# Patient Record
Sex: FEMALE | Race: WHITE | NOT HISPANIC OR LATINO | Employment: FULL TIME | URBAN - METROPOLITAN AREA
[De-identification: names, ages, dates, MRNs, and addresses within clinical notes are randomized per-mention and may not be internally consistent; named-entity substitution may affect disease eponyms.]

---

## 2018-09-19 ENCOUNTER — TELEPHONE (OUTPATIENT)
Dept: ELECTROPHYSIOLOGY | Facility: CLINIC | Age: 58
End: 2018-09-19

## 2018-09-19 NOTE — TELEPHONE ENCOUNTER
----- Message from Chastity Glez MA sent at 9/19/2018  9:50 AM CDT -----  Spoke to pt was in ed with PSVT on 9/11.still not feeling great. Pt is requesting ASAP appt in [place]. Can you look into this and call to triage? Dr Humphreys is in [place] on Monday.  ----- Message -----  From: Chastity Glez MA  Sent: 9/19/2018   9:42 AM  To: Chastity Glez MA        ----- Message -----  From: Dinora Perez  Sent: 9/19/2018   9:28 AM  To: [facility] Staff    Type: Needs Appointment     Who Called:  Patient  Best Call Back Number: [phone]  Additional Information: Patient left previous message requesting appointment with doctor/has not heard from anyone/please call patient back to schedule or advise. Patient had SVT.

## 2018-09-19 NOTE — TELEPHONE ENCOUNTER
Spoke with patient. She was in the ER with SVT and converted with IV meds. She needs to be seen soon. Dr Humphreys gave the ok to overbook for [place] on Monday. Advised patient that I will send a message to Nimisha Meadows with the [facility] staff to schedule appt with Dr Humphreys on Monday. She verbalizes understanding.

## 2018-09-20 ENCOUNTER — TELEPHONE (OUTPATIENT)
Dept: ELECTROPHYSIOLOGY | Facility: CLINIC | Age: 58
End: 2018-09-20

## 2018-09-20 NOTE — TELEPHONE ENCOUNTER
----- Message from Chastity Glez MA sent at 9/20/2018  8:53 AM CDT -----  Contact: Patient      ----- Message -----  From: Marietta Valenzuela  Sent: 9/20/2018   8:38 AM  To: Petr Hollis Staff    The Pt states that it is ok for the appointment in [place] Please call her back @ [phone]. ThanksMarietta

## 2018-09-20 NOTE — TELEPHONE ENCOUNTER
Olimpia spoke with patient and advised to go to [place] office at 1:20pm on Monday for appt with Dr Humphreys.

## 2018-09-24 ENCOUNTER — INITIAL CONSULT (OUTPATIENT)
Dept: CARDIOLOGY | Facility: CLINIC | Age: 58
End: 2018-09-24
Payer: COMMERCIAL

## 2018-09-24 ENCOUNTER — PATIENT MESSAGE (OUTPATIENT)
Dept: ELECTROPHYSIOLOGY | Facility: CLINIC | Age: 58
End: 2018-09-24

## 2018-09-24 VITALS
HEART RATE: 87 BPM | HEIGHT: 65 IN | SYSTOLIC BLOOD PRESSURE: 134 MMHG | WEIGHT: 222 LBS | BODY MASS INDEX: 36.99 KG/M2 | DIASTOLIC BLOOD PRESSURE: 90 MMHG

## 2018-09-24 DIAGNOSIS — F41.9 ANXIETY DISORDER, UNSPECIFIED TYPE: ICD-10-CM

## 2018-09-24 DIAGNOSIS — I10 HYPERTENSION, WELL CONTROLLED: ICD-10-CM

## 2018-09-24 DIAGNOSIS — I47.10 SVT (SUPRAVENTRICULAR TACHYCARDIA): Primary | ICD-10-CM

## 2018-09-24 PROCEDURE — 99244 OFF/OP CNSLTJ NEW/EST MOD 40: CPT | Mod: S$GLB,,, | Performed by: INTERNAL MEDICINE

## 2018-09-24 PROCEDURE — 99999 PR PBB SHADOW E&M-EST. PATIENT-LVL III: CPT | Mod: PBBFAC,,, | Performed by: INTERNAL MEDICINE

## 2018-09-24 NOTE — PROGRESS NOTES
Subjective:    Patient ID:  Dennise Watkins is a 58 y.o. female who presents for evaluation of PSVT (Post hosp f/u - Dr. Talamantes [facility] - Ref by Dr. Soto)      HPI  59 yo female with SVT, Htn, DM, anxiety.  Primary cardiologist is Dr. Soto.  Presented to [facility] ED 9/11/18 with sustained palpitations and presyncope.  She was doing carpool at school.  Bent over to pick something up and started walking.  Acute onset of symptoms.  ECG revealed SVT with possible pseudo R prime.  Terminated with 6 mg of Adenosine.  15 minutes later had recurrence, again terminated with Adenosine.  Toprol increased from 25 mg daily to 50 mg in am + 25 mg in PM.  Notes occasional high HR's, ie HR's in the 110's when gardening, but nothing similar to above.  Feeling ok.  Notes occasional dizziness.  Denies palpitations.      Review of Systems   Constitution: Negative. Negative for weakness and malaise/fatigue.   Cardiovascular: Positive for palpitations. Negative for chest pain, dyspnea on exertion, irregular heartbeat, leg swelling, near-syncope, orthopnea, paroxysmal nocturnal dyspnea and syncope.   Respiratory: Negative for cough and shortness of breath.    Neurological: Positive for dizziness. Negative for light-headedness.   All other systems reviewed and are negative.       Objective:    Physical Exam   Constitutional: She is oriented to person, place, and time. She appears well-developed and well-nourished.   Eyes: Conjunctivae are normal. No scleral icterus.   Neck: No JVD present. No tracheal deviation present.   Cardiovascular: Normal rate and regular rhythm. PMI is not displaced.   Pulmonary/Chest: Effort normal and breath sounds normal. No respiratory distress.   Abdominal: Soft. There is no hepatosplenomegaly. There is no tenderness.   Musculoskeletal: She exhibits no edema or tenderness.   Neurological: She is alert and oriented to person, place, and time.   Skin: Skin is warm and dry. No rash noted.   Psychiatric: She has a normal  mood and affect. Her behavior is normal.         Assessment:       1. SVT (supraventricular tachycardia)    2. Hypertension, well controlled    3. Uncontrolled type 2 diabetes mellitus without complication, without long-term current use of insulin    4. Anxiety disorder, unspecified type         Plan:           SVT, very symptomatic.  Offered RFA.  Risks and benefits of RFA discussed, she would like to proceed.  Hold Toprol 3 days prior.  Anesthesia (prefer MAC).

## 2018-09-24 NOTE — PROGRESS NOTES
ABLATION EDUCATION CHECKLIST    DAY OF PROCEDURE:    9/28/18 @ 9 AM  Report to Cardiology Waiting Room on 3rd floor of the Hospital    · Do not eat or drink anything after: 12 mn on the night before your procedure  · Please do not wear makeup (especially mascara) when arriving for your procedure    Medications:   · HOLD Toprol (Metoprolol) for 3 days prior to ablation. Last dose will be 9/24/2018  · You may take your other usual morning medications with a sip of water      Directions to the Cardiology Waiting Room  If you park in the Parking Garage:  Take elevators to the 2nd floor  Walk up ramp and turn right by [address] Elevators  Take elevator to the 3rd floor  Upon exiting the elevator, turn away from the clinic areas  Walk [address] around to front of hospital to area with windows overlooking [facility]  Check in at Reception Desk  OR  If family is dropping you off:  Have them drop you off at the front of the Hospital  (Near the ER, where all the flags are hung).  Take the E elevators to the 3rd floor.  Check in at the Reception Desk in the waiting room.    · You will be spending the night after your procedure.  · You will need someone to drive you home the day after your procedure.  · Your pain during your procedure will be managed by the anesthesia team.     Any need to reschedule or cancel procedures, or any questions regarding your procedures should be addressed directly with the Arrhythmia Department Nurses at the following phone number: [phone]

## 2018-09-24 NOTE — H&P (VIEW-ONLY)
Subjective:    Patient ID:  Dennise Watkins is a 58 y.o. female who presents for evaluation of PSVT (Post hosp f/u - Dr. Talamantes [facility] - Ref by Dr. Soto)      HPI  59 yo female with SVT, Htn, DM, anxiety.  Primary cardiologist is Dr. Soto.  Presented to [facility] ED 9/11/18 with sustained palpitations and presyncope.  She was doing carpool at school.  Bent over to pick something up and started walking.  Acute onset of symptoms.  ECG revealed SVT with possible pseudo R prime.  Terminated with 6 mg of Adenosine.  15 minutes later had recurrence, again terminated with Adenosine.  Toprol increased from 25 mg daily to 50 mg in am + 25 mg in PM.  Notes occasional high HR's, ie HR's in the 110's when gardening, but nothing similar to above.  Feeling ok.  Notes occasional dizziness.  Denies palpitations.      Review of Systems   Constitution: Negative. Negative for weakness and malaise/fatigue.   Cardiovascular: Positive for palpitations. Negative for chest pain, dyspnea on exertion, irregular heartbeat, leg swelling, near-syncope, orthopnea, paroxysmal nocturnal dyspnea and syncope.   Respiratory: Negative for cough and shortness of breath.    Neurological: Positive for dizziness. Negative for light-headedness.   All other systems reviewed and are negative.       Objective:    Physical Exam   Constitutional: She is oriented to person, place, and time. She appears well-developed and well-nourished.   Eyes: Conjunctivae are normal. No scleral icterus.   Neck: No JVD present. No tracheal deviation present.   Cardiovascular: Normal rate and regular rhythm. PMI is not displaced.   Pulmonary/Chest: Effort normal and breath sounds normal. No respiratory distress.   Abdominal: Soft. There is no hepatosplenomegaly. There is no tenderness.   Musculoskeletal: She exhibits no edema or tenderness.   Neurological: She is alert and oriented to person, place, and time.   Skin: Skin is warm and dry. No rash noted.   Psychiatric: She has a normal  mood and affect. Her behavior is normal.         Assessment:       1. SVT (supraventricular tachycardia)    2. Hypertension, well controlled    3. Uncontrolled type 2 diabetes mellitus without complication, without long-term current use of insulin    4. Anxiety disorder, unspecified type         Plan:           SVT, very symptomatic.  Offered RFA.  Risks and benefits of RFA discussed, she would like to proceed.  Hold Toprol 3 days prior.  Anesthesia (prefer MAC).

## 2018-09-27 ENCOUNTER — TELEPHONE (OUTPATIENT)
Dept: ELECTROPHYSIOLOGY | Facility: CLINIC | Age: 58
End: 2018-09-27

## 2018-09-27 NOTE — TELEPHONE ENCOUNTER
Spoke with patient and confirmed that she took her last dose of metoprolol on 9/24/18, as instructed. Reviewed all pre-op instructions and she verbalized understanding, voicing no other questions or concerns.

## 2018-09-28 ENCOUNTER — HOSPITAL ENCOUNTER (OUTPATIENT)
Facility: HOSPITAL | Age: 58
Discharge: HOME | End: 2018-09-28
Attending: INTERNAL MEDICINE | Admitting: INTERNAL MEDICINE
Payer: COMMERCIAL

## 2018-09-28 ENCOUNTER — ANESTHESIA (OUTPATIENT)
Dept: MEDSURG UNIT | Facility: HOSPITAL | Age: 58
End: 2018-09-28
Payer: COMMERCIAL

## 2018-09-28 ENCOUNTER — ANESTHESIA EVENT (OUTPATIENT)
Dept: MEDSURG UNIT | Facility: HOSPITAL | Age: 58
End: 2018-09-28
Payer: COMMERCIAL

## 2018-09-28 VITALS
TEMPERATURE: 97 F | BODY MASS INDEX: 36.32 KG/M2 | SYSTOLIC BLOOD PRESSURE: 138 MMHG | RESPIRATION RATE: 18 BRPM | WEIGHT: 218 LBS | DIASTOLIC BLOOD PRESSURE: 74 MMHG | OXYGEN SATURATION: 98 % | HEART RATE: 92 BPM | HEIGHT: 65 IN

## 2018-09-28 DIAGNOSIS — N39.0 URINARY TRACT INFECTION: ICD-10-CM

## 2018-09-28 DIAGNOSIS — I47.10 SVT (SUPRAVENTRICULAR TACHYCARDIA): Primary | ICD-10-CM

## 2018-09-28 DIAGNOSIS — I47.10 SUPRAVENTRICULAR TACHYCARDIA: ICD-10-CM

## 2018-09-28 LAB
APTT BLDCRRT: 23.9 SEC
INR PPP: 1
POCT GLUCOSE: 101 MG/DL (ref 70–110)
PROTHROMBIN TIME: 10.6 SEC

## 2018-09-28 PROCEDURE — 63600175 PHARM REV CODE 636 W HCPCS

## 2018-09-28 PROCEDURE — 37000008 HC ANESTHESIA 1ST 15 MINUTES: Performed by: INTERNAL MEDICINE

## 2018-09-28 PROCEDURE — 93623 PRGRMD STIMJ&PACG IV RX NFS: CPT | Mod: 26,,, | Performed by: INTERNAL MEDICINE

## 2018-09-28 PROCEDURE — 25000003 PHARM REV CODE 250

## 2018-09-28 PROCEDURE — 93005 ELECTROCARDIOGRAM TRACING: CPT

## 2018-09-28 PROCEDURE — 37000009 HC ANESTHESIA EA ADD 15 MINS: Performed by: INTERNAL MEDICINE

## 2018-09-28 PROCEDURE — 63600175 PHARM REV CODE 636 W HCPCS: Performed by: NURSE ANESTHETIST, CERTIFIED REGISTERED

## 2018-09-28 PROCEDURE — D9220A PRA ANESTHESIA: Mod: ,,, | Performed by: ANESTHESIOLOGY

## 2018-09-28 PROCEDURE — 25000003 PHARM REV CODE 250: Performed by: NURSE PRACTITIONER

## 2018-09-28 PROCEDURE — 93010 ELECTROCARDIOGRAM REPORT: CPT | Mod: ,,, | Performed by: INTERNAL MEDICINE

## 2018-09-28 PROCEDURE — 93653 COMPRE EP EVAL TX SVT: CPT | Mod: ,,, | Performed by: INTERNAL MEDICINE

## 2018-09-28 PROCEDURE — 93621 COMP EP EVL L PAC&REC C SINS: CPT | Mod: 26,,, | Performed by: INTERNAL MEDICINE

## 2018-09-28 PROCEDURE — 85610 PROTHROMBIN TIME: CPT

## 2018-09-28 PROCEDURE — 85730 THROMBOPLASTIN TIME PARTIAL: CPT

## 2018-09-28 PROCEDURE — C1730 CATH, EP, 19 OR FEW ELECT: HCPCS

## 2018-09-28 PROCEDURE — 82962 GLUCOSE BLOOD TEST: CPT | Performed by: INTERNAL MEDICINE

## 2018-09-28 PROCEDURE — 93613 INTRACARDIAC EPHYS 3D MAPG: CPT | Mod: ,,, | Performed by: INTERNAL MEDICINE

## 2018-09-28 PROCEDURE — 25000003 PHARM REV CODE 250: Performed by: NURSE ANESTHETIST, CERTIFIED REGISTERED

## 2018-09-28 RX ORDER — DIPHENHYDRAMINE HYDROCHLORIDE 50 MG/ML
25 INJECTION INTRAMUSCULAR; INTRAVENOUS EVERY 6 HOURS PRN
Status: DISCONTINUED | OUTPATIENT
Start: 2018-09-28 | End: 2018-09-28 | Stop reason: HOSPADM

## 2018-09-28 RX ORDER — ACETAMINOPHEN 325 MG/1
650 TABLET ORAL ONCE
Status: DISCONTINUED | OUTPATIENT
Start: 2018-09-28 | End: 2018-09-28 | Stop reason: HOSPADM

## 2018-09-28 RX ORDER — FENTANYL CITRATE 50 UG/ML
25 INJECTION, SOLUTION INTRAMUSCULAR; INTRAVENOUS EVERY 5 MIN PRN
Status: DISCONTINUED | OUTPATIENT
Start: 2018-09-28 | End: 2018-09-28 | Stop reason: HOSPADM

## 2018-09-28 RX ORDER — SODIUM CHLORIDE 0.9 % (FLUSH) 0.9 %
3 SYRINGE (ML) INJECTION
Status: DISCONTINUED | OUTPATIENT
Start: 2018-09-28 | End: 2018-09-28 | Stop reason: HOSPADM

## 2018-09-28 RX ORDER — IBUPROFEN 400 MG/1
400 TABLET ORAL ONCE
Status: COMPLETED | OUTPATIENT
Start: 2018-09-28 | End: 2018-09-28

## 2018-09-28 RX ORDER — MIDAZOLAM HYDROCHLORIDE 1 MG/ML
INJECTION, SOLUTION INTRAMUSCULAR; INTRAVENOUS
Status: DISCONTINUED | OUTPATIENT
Start: 2018-09-28 | End: 2018-09-28

## 2018-09-28 RX ORDER — PHENYLEPHRINE HYDROCHLORIDE 10 MG/ML
INJECTION INTRAVENOUS
Status: DISCONTINUED | OUTPATIENT
Start: 2018-09-28 | End: 2018-09-28

## 2018-09-28 RX ORDER — SODIUM CHLORIDE 9 MG/ML
INJECTION, SOLUTION INTRAVENOUS CONTINUOUS
Status: DISCONTINUED | OUTPATIENT
Start: 2018-09-28 | End: 2018-09-28 | Stop reason: HOSPADM

## 2018-09-28 RX ORDER — ONDANSETRON 2 MG/ML
INJECTION INTRAMUSCULAR; INTRAVENOUS
Status: DISCONTINUED | OUTPATIENT
Start: 2018-09-28 | End: 2018-09-28

## 2018-09-28 RX ORDER — FENTANYL CITRATE 50 UG/ML
INJECTION, SOLUTION INTRAMUSCULAR; INTRAVENOUS
Status: DISCONTINUED | OUTPATIENT
Start: 2018-09-28 | End: 2018-09-28

## 2018-09-28 RX ORDER — PROPOFOL 10 MG/ML
VIAL (ML) INTRAVENOUS CONTINUOUS PRN
Status: DISCONTINUED | OUTPATIENT
Start: 2018-09-28 | End: 2018-09-28

## 2018-09-28 RX ORDER — LIDOCAINE HCL/PF 100 MG/5ML
SYRINGE (ML) INTRAVENOUS
Status: DISCONTINUED | OUTPATIENT
Start: 2018-09-28 | End: 2018-09-28

## 2018-09-28 RX ORDER — EPHEDRINE SULFATE 50 MG/ML
INJECTION, SOLUTION INTRAVENOUS
Status: DISCONTINUED | OUTPATIENT
Start: 2018-09-28 | End: 2018-09-28

## 2018-09-28 RX ADMIN — PHENYLEPHRINE HYDROCHLORIDE 100 MCG: 10 INJECTION INTRAVENOUS at 12:09

## 2018-09-28 RX ADMIN — CALCIUM CHLORIDE 0.5 G: 100 INJECTION, SOLUTION INTRAVENOUS at 12:09

## 2018-09-28 RX ADMIN — SODIUM CHLORIDE, SODIUM GLUCONATE, SODIUM ACETATE, POTASSIUM CHLORIDE, MAGNESIUM CHLORIDE, SODIUM PHOSPHATE, DIBASIC, AND POTASSIUM PHOSPHATE: .53; .5; .37; .037; .03; .012; .00082 INJECTION, SOLUTION INTRAVENOUS at 12:09

## 2018-09-28 RX ADMIN — PHENYLEPHRINE HYDROCHLORIDE 100 MCG: 10 INJECTION INTRAVENOUS at 11:09

## 2018-09-28 RX ADMIN — FENTANYL CITRATE 25 MCG: 50 INJECTION, SOLUTION INTRAMUSCULAR; INTRAVENOUS at 01:09

## 2018-09-28 RX ADMIN — PROPOFOL 100 MCG/KG/MIN: 10 INJECTION, EMULSION INTRAVENOUS at 11:09

## 2018-09-28 RX ADMIN — FENTANYL CITRATE 25 MCG: 50 INJECTION, SOLUTION INTRAMUSCULAR; INTRAVENOUS at 12:09

## 2018-09-28 RX ADMIN — FENTANYL CITRATE 25 MCG: 50 INJECTION, SOLUTION INTRAMUSCULAR; INTRAVENOUS at 11:09

## 2018-09-28 RX ADMIN — SODIUM CHLORIDE: 0.9 INJECTION, SOLUTION INTRAVENOUS at 11:09

## 2018-09-28 RX ADMIN — ISOPROTERENOL HYDROCHLORIDE 2 MCG/MIN: 0.2 INJECTION, SOLUTION INTRAMUSCULAR; INTRAVENOUS at 12:09

## 2018-09-28 RX ADMIN — IBUPROFEN 400 MG: 400 TABLET, FILM COATED ORAL at 05:09

## 2018-09-28 RX ADMIN — EPHEDRINE SULFATE 10 MG: 50 INJECTION, SOLUTION INTRAMUSCULAR; INTRAVENOUS; SUBCUTANEOUS at 12:09

## 2018-09-28 RX ADMIN — MIDAZOLAM 2 MG: 1 INJECTION INTRAMUSCULAR; INTRAVENOUS at 11:09

## 2018-09-28 RX ADMIN — PHENYLEPHRINE HYDROCHLORIDE 200 MCG: 10 INJECTION INTRAVENOUS at 12:09

## 2018-09-28 RX ADMIN — PHENYLEPHRINE HYDROCHLORIDE 0.25 MCG/KG/MIN: 10 INJECTION INTRAVENOUS at 12:09

## 2018-09-28 RX ADMIN — LIDOCAINE HYDROCHLORIDE 50 MG: 20 INJECTION, SOLUTION INTRAVENOUS at 11:09

## 2018-09-28 NOTE — PROGRESS NOTES
Patient admitted to recovery see [facility] for complete assessment pacu bcg's maintained safety measures verified patient instructed on pain scale and patient verbalized understanding. Called for ekg also called patient's . Updated patient's  on patient location with the permission of patient.

## 2018-09-28 NOTE — PLAN OF CARE
Problem: Patient Care Overview  Goal: Plan of Care Review  Outcome: Ongoing (interventions implemented as appropriate)  Pt admitted to room.  Alert and oriented.  IV placed.  Labs sent.  Plan of care reviewed with patient.

## 2018-09-28 NOTE — ANESTHESIA POSTPROCEDURE EVALUATION
"Anesthesia Post Evaluation    Patient: Dennise Watkins    Procedure(s) Performed: Procedure(s) (LRB):  Ablation (N/A)    Final Anesthesia Type: general  Patient location during evaluation: PACU  Patient participation: Yes- Able to Participate  Level of consciousness: awake and alert  Post-procedure vital signs: reviewed and stable  Pain management: adequate  Airway patency: patent  PONV status at discharge: No PONV  Anesthetic complications: no      Cardiovascular status: hemodynamically stable  Respiratory status: unassisted  Hydration status: euvolemic  Follow-up not needed.        Visit Vitals  BP (!) 141/88 (BP Location: Right arm, Patient Position: Lying)   Pulse 99   Temp 35.9 °C (96.6 °F) (Oral)   Resp 18   Ht 5' 5" (1.651 m)   Wt 98.9 kg (218 lb)   SpO2 98%   Breastfeeding? No   BMI 36.28 kg/m²       Pain/Yasir Score: Pain Assessment Performed: Yes (9/28/2018  3:30 PM)  Presence of Pain: denies (9/28/2018  3:30 PM)  Yasir Score: 10 (9/28/2018  3:00 PM)        "

## 2018-09-28 NOTE — ANESTHESIA PREPROCEDURE EVALUATION
09/28/2018  Pre-operative evaluation for Procedure(s) (LRB):  Ablation (N/A)    Dennise Watkins is a 58 y.o. female HTN, DMII, anxiety    Patient Active Problem List   Diagnosis    Recurrent UTI    DM (diabetes mellitus), type 2, uncontrolled    Hypertension, well controlled    SVT (supraventricular tachycardia)    Anxiety disorder       Review of patient's allergies indicates:   Allergen Reactions    Sulfa (sulfonamide antibiotics) Hives    Aleve [naproxen sodium] Other (See Comments)     Nose bleed    Amoxicillin-pot clavulanate Diarrhea     Other reaction(s): diarrhea       No current facility-administered medications on file prior to encounter.      Current Outpatient Medications on File Prior to Encounter   Medication Sig Dispense Refill    alprazolam (XANAX) 0.25 MG tablet TAKE 1 TABLET BY MOUTH 2 TIMES A DAY AS NEEDED 25 tablet 0    amlodipine (NORVASC) 5 MG tablet Take 5 mg by mouth daily as needed.      cefUROXime (CEFTIN) 500 MG tablet Take 1 tablet by mouth 2 (two) times daily.      citalopram (CELEXA) 20 MG tablet 1 po twice daily 60 tablet 9    fluticasone (FLONASE) 50 mcg/actuation nasal spray 2 sprays by Each Nare route daily as needed for Rhinitis or Allergies. 16 g 3    ketoconazole (NIZORAL) 2 % shampoo Use daily as needed 120 mL 6    levocetirizine (XYZAL) 5 MG tablet Take 5 mg by mouth nightly as needed.   5    metoprolol succinate (TOPROL-XL) 50 MG 24 hr tablet Take 1 tablet (50 mg total) by mouth once daily. (Patient taking differently: Take 50 mg by mouth 2 (two) times daily. ) 30 tablet 2    MINIVELLE 0.0375 mg/24 hr 1 patch twice a week      mometasone 0.1% (ELOCON) 0.1 % cream Apply to affected area daily 45 g 1    NATURE-THROID 65 mg Tab Take 1 tablet by mouth every morning.  0    olmesartan-hydrochlorothiazide (BENICAR HCT) 40-12.5 mg Tab Take 1 tablet by mouth  once daily. 90 tablet 3    omeprazole (PRILOSEC) 20 MG capsule TAKE 1 CAPSULE TWICE A  capsule 3    pimecrolimus (ELIDEL) 1 % cream Use BID as needed 30 g 3    terconazole (TERAZOL 7) 0.4 % Crea 0.4 %.      VENTOLIN HFA 90 mcg/actuation inhaler Inhale 2 puffs into the lungs 3 (three) times daily.         Past Surgical History:   Procedure Laterality Date    ESOPHAGOGASTRODUODENOSCOPY      HYSTERECTOMY      OOPHORECTOMY  2000    RHINOPLASTY TIP      TONSILLECTOMY, ADENOIDECTOMY         Social History     Socioeconomic History    Marital status:      Spouse name: Not on file    Number of children: Not on file    Years of education: Not on file    Highest education level: Not on file   Social Needs    Financial resource strain: Not on file    Food insecurity - worry: Not on file    Food insecurity - inability: Not on file    Transportation needs - medical: Not on file    Transportation needs - non-medical: Not on file   Occupational History    Not on file   Tobacco Use    Smoking status: Never Smoker    Smokeless tobacco: Never Used   Substance and Sexual Activity    Alcohol use: No    Drug use: No    Sexual activity: Yes     Partners: Male   Other Topics Concern    Not on file   Social History Narrative    Not on file         CBC: No results for input(s): WBC, RBC, HGB, HCT, PLT, MCV, MCH, MCHC in the last 72 hours.    CMP: No results for input(s): NA, K, CL, CO2, BUN, CREATININE, GLU, MG, PHOS, CALCIUM, ALBUMIN, PROT, ALKPHOS, ALT, AST, BILITOT in the last 72 hours.    INR  No results for input(s): PT, INR, PROTIME, APTT in the last 72 hours.        Diagnostic Studies:      EKD Echo:  No results found for this or any previous visit.      Anesthesia Evaluation    I have reviewed the Patient Summary Reports.     I have reviewed the Medications.     Review of Systems  Anesthesia Hx:  History of prior surgery of interest to airway management or planning: Denies Family  Hx of Anesthesia complications.   Denies Personal Hx of Anesthesia complications.       Physical Exam  General:  Obesity    Airway/Jaw/Neck:  Airway Findings: Mouth Opening: Normal Tongue: Normal  General Airway Assessment: Adult  Mallampati: II  TM Distance: Normal, at least 6 cm  Jaw/Neck Findings:  Neck ROM: Normal ROM      Dental:  Dental Findings: In tact   Chest/Lungs:  Chest/Lungs Findings: Clear to auscultation, Normal Respiratory Rate         Mental Status:  Mental Status Findings:  Cooperative, Alert and Oriented         Anesthesia Plan  Type of Anesthesia, risks & benefits discussed:  Anesthesia Type:  general  Patient's Preference:   Intra-op Monitoring Plan: standard ASA monitors  Intra-op Monitoring Plan Comments:   Post Op Pain Control Plan: multimodal analgesia  Post Op Pain Control Plan Comments:   Induction:   IV  Beta Blocker:  Patient is on a Beta-Blocker and has received one dose within the past 24 hours (No further documentation required).       Informed Consent: Patient understands risks and agrees with Anesthesia plan.  Questions answered. Anesthesia consent signed with patient.  ASA Score: 3     Day of Surgery Review of History & Physical:    H&P update referred to the surgeon.         Ready For Surgery From Anesthesia Perspective.

## 2018-09-28 NOTE — INTERVAL H&P NOTE
Mrs. Watkins is a 58 y.o. Female with a PMHx of SVT, HTN, DM, and anxiety. She presents today for a scheduled SVT ablation with Dr. Humphreys. She denies any palpitations, chest pain, SOB, ROSARIO, light headedness, dizziness, syncope or syncopal episodes, infections, bleeding, or rashes. She denies any surgeries in the past 30 days. She is currently taking metoprolol 50 mg daily, last dose Monday (9/24/18), norvasc 5 mg PRN, last dose three weeks ago. She states she is no longer taking benicar, instead taking diovan 12.5 mg daily, last dose this AM (9/28/18). She denies the use of aspirin or any blood thinners.    EKG this morning reveals NSR at 90 BPM. VSS. EKG reviewed by Dr. Humphreys.  Labs from today and 9/21/18 reviewed. Dr. Humphreys aware.     The patient has been examined and the H&P has been reviewed:    I concur with the findings and no changes have occurred since H&P was written.     Review of Systems   Constitution: No acute distress, pleasant patient answering questions appropriately, negative for fevers/chills, malaise/fatigue, or weakness.   HENT: Negative for ear pain and tinnitus.   Eyes: Negative for blurred vision.   Cardiovascular:  Negative for chest pain, palpitations, near-syncope, and syncope.   Respiratory: Negative for shortness of breath or ROSARIO.  Endocrine:  Negative for polyuria.   Hematologic/Lymphatic: Negative for bruise/bleed easily.   Skin: Negative for rash.   Musculoskeletal: Negative for joint pain and muscle weakness.   Extremity: Negative for swelling in the lower extremities.   Gastrointestinal: Negative for abdominal pain and change in bowel habit.   Genitourinary: Negative for frequency.   Neurological: Negative for dizziness.   Psychiatric/Behavioral: Negative for depression, anxiety     Physical Exam:   General: Well developed, well nourished, No distress on room air   HEENT: Head is normocephalic, atraumatic;  Lungs: Clear to auscultation bilaterally.  No wheezing. Normal respiratory  effort.  Cardiovascular: S1 S2 Normal rate, regular rhythm and normal heart sounds, PMI is not displaced.  Extremities: No LE edema. Pulses 2+ and symmetric.   Abdomen: Abdomen is soft, non-tender non-distended with normal bowel sounds.  Skin: Skin color, texture, turgor normal. No rashes.  Musculoskeletal: Normal range of motion.   Neurologic: Normal strength and tone. No focal numbness or weakness.   Psychiatric: Normal mood and affect. Behavior is normal. Alert and oriented X 3.    Prior to procedure, extensive discussion with patient and  regarding risks and benefits of ablation, and patient would like to proceed.  Risks of procedure include but are not limited to the risk of infection, bleeding, stroke, paralysis,  MI, death, embolism, perforation requiring emergency draining or surgery, AV block, possibility for need for  pacemaker implantation.  The patient and  voice understanding and all questions have been answered. No further questions/concerns voiced at this time.       Anesthesia/Surgery risks, benefits and alternative options discussed and understood by patient/family.    Active Hospital Problems    Diagnosis  POA    SVT (supraventricular tachycardia) [I47.1]  Yes      Resolved Hospital Problems   No resolved problems to display.     ROSALINE Mckeon-CORRIE  Cardiology Electrophysiology  NP   [facility]    Attending: Bunny Humphreys MD

## 2018-09-28 NOTE — PLAN OF CARE
Problem: Patient Care Overview  Goal: Plan of Care Review  Outcome: Ongoing (interventions implemented as appropriate)  Report received from JESSICA Whitmore. Patient s/p svt rfa. bilat groin sites cdi, soft. + pulses. Post procedure protocol dicussed with patient. Call light in reach, will monitor.

## 2018-09-28 NOTE — NURSING TRANSFER
Nursing Transfer Note      9/28/2018     Transfer To: short stay 10    Transfer via stretcher    Transfer with cardiac monitoring    Transported by juanis rn    Medicines sent: none    Chart send with patient: Yes    Notified: nurse    Patient reassessed at: see epic (date, time)    Upon arrival to floor: to room no complaints no distress noted.

## 2018-09-28 NOTE — TRANSFER OF CARE
"Anesthesia Transfer of Care Note    Patient: Dennise Watkins    Procedure(s) Performed: Procedure(s) (LRB):  Ablation (N/A)    Patient location: Cath Lab    Anesthesia Type: general    Transport from OR: Transported from OR on room air with adequate spontaneous ventilation    Post pain: adequate analgesia    Post assessment: no apparent anesthetic complications and tolerated procedure well    Post vital signs: stable    Level of consciousness: awake    Nausea/Vomiting: no nausea/vomiting    Complications: none    Transfer of care protocol was followed      Last vitals:   Visit Vitals  BP (!) 159/85 (BP Location: Right arm)   Pulse 97   Temp 36.3 °C (97.4 °F)   Resp 16   Ht 5' 5" (1.651 m)   Wt 98.9 kg (218 lb)   SpO2 97%   Breastfeeding? No   BMI 36.28 kg/m²     "

## 2018-09-29 NOTE — DISCHARGE SUMMARY
[facility]  Cardiac Electrophysiology  Discharge Summary      Patient Name: Dennise Watkins  MRN: [id number]  Admission Date: 9/28/2018  Hospital Length of Stay: 0 days  Discharge Date and Time:  09/28/2018 8:44 PM  Attending Physician: Bunny Humphreys MD  Discharging Provider: Brittany Babcock NP  Primary Care Physician: Junito Gilmore II, MD    HPI:   Mrs. Watkins is a 58 y.o. Female with a PMHx of SVT, HTN, DM, and anxiety. She presents today for a scheduled SVT ablation with Dr. Humphreys. She denies any palpitations, chest pain, SOB, ROSARIO, light headedness, dizziness, syncope or syncopal episodes, infections, bleeding, or rashes. She denies any surgeries in the past 30 days. She is currently taking metoprolol 50 mg daily, last dose Monday (9/24/18), norvasc 5 mg PRN, last dose three weeks ago. She states she is no longer taking benicar, instead taking diovan 12.5 mg daily, last dose this AM (9/28/18). She denies the use of aspirin or any blood thinners.     EKG this morning reveals NSR at 90 BPM. VSS. EKG reviewed by Dr. Humphreys.  Labs from today and 9/21/18 reviewed. Dr. Humphreys aware.      The patient has been examined and the H&P has been reviewed:     I concur with the findings and no changes have occurred since H&P was written.      Procedure(s) (LRB):  Ablation (N/A)     Indwelling Lines/Drains at time of discharge:  Lines/Drains/Airways          None          Hospital Course: Patient underwent SVT ablation (see procedure note for details), tolerated procedure with no acute complications. VSS. Bilateral groins with no bleeding or hematoma noted, dressings C/D/I. Patient ambulating, tolerating PO intake, and voiding with no issues. Patient to continue all home medications. Pt to follow up with Bunny Humphreys MD in 2 months. Discharge plans/instructions discussed with patient, hisband, and daughter who verbalized understanding and agreement of plans of care. No further questions or concerns voiced  at this time     Consults: Anesthesia.    Significant Diagnostic Studies: Post op EKG revealed NSR at 99 BPM.    Final Active Diagnoses:    Diagnosis Date Noted POA    PRINCIPAL PROBLEM:  SVT (supraventricular tachycardia) [I47.1] 09/24/2018 Yes      Problems Resolved During this Admission:     Discharged Condition: stable    Disposition: Home or Self Care    Follow Up:  Follow-up Information     Bunny Humphreys MD In 2 months.    Specialties:  Electrophysiology, Cardiology  Contact information:  [address]  [facility] [address]  [phone]                 Patient Instructions:      Diet Cardiac     No driving until:   Order Comments: No driving or operating heavy machinery for 24-48 hours post procedure.     Lifting restrictions   Order Comments: No bending, strenuous activity, straining, or lifting anything greater than 5-10 pounds for 1 week.     Other restrictions (specify):     Lifting restrictions   Order Comments: No bending, straining, strenuous activity, or lifting anything greater than 5-10 pounds for 1 week.     No driving until:   Order Comments: No driving or operating heavy machinery for 24-48 hours post procedure.     Other restrictions (specify):   Order Comments: Patient may be discharged when the following parameters are met:  -Vital signs stable.  -Bed rest complete.  -Patient able to ambulate without difficulty, tolerate PO intake, and void without difficulty.  -Patient is stable.     Remove dressing in 24 hours   Order Comments: Once the skin has healed (1 week), bathing in a tub or swimming is allowed.   Inspect the groin site daily and report to the physician any signs of infection at the site: redness, pain, fever >100.4, unusual pain at the access site or affected extremity, unusual swelling at the access site, or any yellow, white or green drainage.  Call 911 if you have:   Bleeding from the puncture site that you cannot stop by doing the following:   Relax and lie down right away.  Keep your leg flat and apply firm pressure to the site using your fingers and a gauze pad. Keep the pressure on for 10 minutes. Continue this until the bleeding stops. This may take awhile. When bleeding stops, cover the site with a sterile bandage and keep your leg still as much as possible.        Shower on day dressing removed (No bath)   Order Comments: May shower in 24 hours. No baths for 1 week.     Shower on day dressing removed (No bath)   Order Comments: May shower in 24 hours. No baths for 1 week.     Medications:  Reconciled Home Medications:      Medication List      CHANGE how you take these medications    metoprolol succinate 50 MG 24 hr tablet  Commonly known as:  TOPROL-XL  Take 1 tablet (50 mg total) by mouth once daily.  What changed:  when to take this        CONTINUE taking these medications    ALPRAZolam 0.25 MG tablet  Commonly known as:  XANAX  TAKE 1 TABLET BY MOUTH 2 TIMES A DAY AS NEEDED     amLODIPine 5 MG tablet  Commonly known as:  NORVASC  Take 5 mg by mouth daily as needed.     ketoconazole 2 % shampoo  Commonly known as:  NIZORAL  Use daily as needed     levocetirizine 5 MG tablet  Commonly known as:  XYZAL  Take 5 mg by mouth nightly as needed.     MINIVELLE 0.0375 mg/24 hr  Generic drug:  estradiol  1 patch twice a week     mometasone 0.1% 0.1 % cream  Commonly known as:  ELOCON  Apply to affected area daily     NATURE-THROID 65 mg Tab  Generic drug:  thyroid (pork)  Take 1 tablet by mouth every morning.     olmesartan-hydrochlorothiazide 40-12.5 mg Tab  Commonly known as:  BENICAR HCT  Take 1 tablet by mouth once daily.     omeprazole 20 MG capsule  Commonly known as:  PRILOSEC  TAKE 1 CAPSULE TWICE A DAY     pimecrolimus 1 % cream  Commonly known as:  ELIDEL  Use BID as needed     TERAZOL 7 0.4 % Crea  Generic drug:  terconazole  0.4 %.     VENTOLIN HFA 90 mcg/actuation inhaler  Generic drug:  albuterol  Inhale 2 puffs into the lungs 3 (three) times daily.        ASK your doctor  about these medications    cefUROXime 500 MG tablet  Commonly known as:  CEFTIN  Take 1 tablet by mouth 2 (two) times daily.     citalopram 20 MG tablet  Commonly known as:  CELEXA  1 po twice daily     fluticasone 50 mcg/actuation nasal spray  Commonly known as:  FLONASE  2 sprays by Each Nare route daily as needed for Rhinitis or Allergies.          Plan:  -Follow up with Dr. Humphreys in 2 months.  -Continue all home medications.  -Follow up with Dr. Soto (cardiology) regarding metoprolol.    Time spent on the discharge of patient: 22 minutes    Brittany Babcock NP  Cardiac Electrophysiology  [facility]    STAFF: Bunny Humphreys MD

## 2018-10-05 ENCOUNTER — TELEPHONE (OUTPATIENT)
Dept: ELECTROPHYSIOLOGY | Facility: CLINIC | Age: 58
End: 2018-10-05

## 2018-10-05 NOTE — TELEPHONE ENCOUNTER
Spoke with patient. States that her HR goes to 100 with any activity. She does not feel like it is SVT. States that she was taking Metoprolol prior to the ablation and was told she didn't need to stay on it. She has history of anxiety and thinks the Metoprolol was really helpful in keeping the heart rate down. Her HR is regular, just elevated. Discussed with Dr Humphreys who is recommending that she resume her metoprolol and monitor her BP. She verbalizes understanding and will keep us posted.

## 2018-10-05 NOTE — TELEPHONE ENCOUNTER
----- Message from Ora Max sent at 10/5/2018  1:49 PM CDT -----  Contact: pt  Pt says since her Ablation when she moves around her heart rate goes up in the 120's and she wants to know if that's normal?    Thanks

## 2019-03-07 ENCOUNTER — OFFICE VISIT (OUTPATIENT)
Dept: ALLERGY | Facility: CLINIC | Age: 59
End: 2019-03-07
Payer: OTHER GOVERNMENT

## 2019-03-07 VITALS
BODY MASS INDEX: 37.32 KG/M2 | WEIGHT: 224 LBS | HEIGHT: 65 IN | SYSTOLIC BLOOD PRESSURE: 138 MMHG | DIASTOLIC BLOOD PRESSURE: 84 MMHG

## 2019-03-07 DIAGNOSIS — J31.0 CHRONIC RHINITIS: Primary | ICD-10-CM

## 2019-03-07 DIAGNOSIS — T78.1XXA POLLEN-FOOD ALLERGY, INITIAL ENCOUNTER: ICD-10-CM

## 2019-03-07 DIAGNOSIS — H10.423 SIMPLE CHRONIC CONJUNCTIVITIS OF BOTH EYES: ICD-10-CM

## 2019-03-07 DIAGNOSIS — L50.8 CHRONIC URTICARIA: ICD-10-CM

## 2019-03-07 DIAGNOSIS — E03.9 HYPOTHYROIDISM, UNSPECIFIED TYPE: ICD-10-CM

## 2019-03-07 PROCEDURE — 99205 OFFICE O/P NEW HI 60 MIN: CPT | Mod: ,,, | Performed by: ALLERGY & IMMUNOLOGY

## 2019-03-07 PROCEDURE — 99205 PR OFFICE/OUTPT VISIT, NEW, LEVL V, 60-74 MIN: ICD-10-PCS | Mod: ,,, | Performed by: ALLERGY & IMMUNOLOGY

## 2019-03-07 RX ORDER — HYDROXYZINE HYDROCHLORIDE 25 MG/1
25 TABLET, FILM COATED ORAL NIGHTLY
Qty: 30 TABLET | Refills: 1 | Status: SHIPPED | OUTPATIENT
Start: 2019-03-07 | End: 2019-05-13 | Stop reason: SDUPTHER

## 2019-03-07 RX ORDER — BENZOCAINE .13; .15; .5; 2 G/100G; G/100G; G/100G; G/100G
1 GEL ORAL 2 TIMES DAILY
Qty: 8.6 G | Refills: 3 | Status: SHIPPED | OUTPATIENT
Start: 2019-03-07 | End: 2019-06-28

## 2019-03-07 RX ORDER — AZELASTINE 1 MG/ML
1 SPRAY, METERED NASAL 2 TIMES DAILY
Qty: 30 ML | Refills: 3 | Status: SHIPPED | OUTPATIENT
Start: 2019-03-07 | End: 2020-01-14 | Stop reason: SDUPTHER

## 2019-03-07 RX ORDER — BEPOTASTINE BESILATE 15 MG/ML
1 SOLUTION/ DROPS OPHTHALMIC 2 TIMES DAILY
Qty: 10 ML | Refills: 2 | Status: SHIPPED | OUTPATIENT
Start: 2019-03-07 | End: 2019-04-17

## 2019-03-07 RX ORDER — MONTELUKAST SODIUM 10 MG/1
10 TABLET ORAL DAILY
Qty: 90 TABLET | Refills: 3 | Status: SHIPPED | OUTPATIENT
Start: 2019-03-07 | End: 2019-04-06

## 2019-03-07 RX ORDER — LEVOCETIRIZINE DIHYDROCHLORIDE 5 MG/1
5 TABLET, FILM COATED ORAL 2 TIMES DAILY
Qty: 60 TABLET | Refills: 5 | Status: SHIPPED | OUTPATIENT
Start: 2019-03-07 | End: 2020-01-14 | Stop reason: SDUPTHER

## 2019-03-07 NOTE — PATIENT INSTRUCTIONS
Nose:  Saline first and use twice daily.   Then use azelastine 1 spray per nare twice per day, may use as needed up to 4 times per day   Then use rhinocort aqua - generic budesonide, 2 spray per nare twice per day for 2 weeks then go back down to 1 spray per nare twice per day.     Start montelukast 10 mg 1 pill once per day.     Nasal spray technique:  Head down   Aim up and out  Spray don't sniff     Eye:  Lubrication drop first - systane complete or thera tears  Then use bepreve eye drops 1 drop per eye twice per day     Skin:  Use xyzal twice per day   Use hydroxyzine for break through itch at night only. This will make you drowsy.

## 2019-03-07 NOTE — PROGRESS NOTES
"Subjective:       Patient ID: Dennise Watkins is a 59 y.o. female.    Chief Complaint: Allergies (reports having "seasonal allergies" such as runny nose, PND, sneezing, eyes draining)    HPI     Pt presents as a new patient for an allergy evaluation for rhinitis.      Onset: childhood  Sx: sneezing, pnd, conjunctivitis   Trigger: spring   Tx: flonase- not routine, xyzal- daily, patanol, tried hydroxyzine Friday that was her sister's.    Testing: dr goyal - non conclusive - tested when she was having urticaria.   - [facility] performed 2-3 years ago.     Atopic Hx    Rhinitis: see above   Oral allergy: eggplant, ? 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 Veggies and fruits.   Latex: denies   Eczema: history of scalp dermatitis and face - not seeing derm.   Urticaria: history of pressure induced urticaria. Still occurs "comes and goes." onset: childhood.   Nsaid: allergy to aleve "nose bleeds" not true allergy  Tolerates ibuprofen.     Infection History    Pneumonia # in the past 12 months: once in her life 30 yrs ago   Sinus infection # in the past 12 months: not recurrent 1-2 times per year    Otitis infection # in the past 12 months:  Denies       PMH:  Hypothyroid  htn      Review of Systems      General: neg unexpected weight changes, fevers, chills, night sweats, malaise  HEENT: see hpi, Neg eye pain, vision changes, ear drainage, nose bleeds, throat tightness, sores in the mouth  CV: Neg chest pain, palpitations, swelling  Resp: see hpi, neg shortness of breath, hemoptysis, cough  GI: see hpi, neg dysphagia, night abdominal pain, reflux, chronic diarrhea, chronic constipation  Derm: See Hpi, neg flushing  Mu/sk: Neg joint pain, joint swelling   Psych: Neg anxiety  neuro: neg chronic headaches, muscle weakness  Endo: neg heat/cold intolerance, chronic fatigue    Objective:     Vitals:    03/07/19 0902   BP: 138/84   Weight: 101.6 kg (224 lb)   Height: 5' 5" (1.651 " m)        Physical Exam      General: no acute distress, well developed well nourished   HEENT:   Head:normocephalic atraumatic  Eyes: SUMAN, EOMI, Neg injection, scleral icterus, or conjunctival papillary hypertrophy.  Ears: tm clear bilaterally, normal canal  Nose: 2-3+ inferior turbinates pink, neg nasal polyps            Mucosa: dry             Septal irritation: none   OP: mucus membranes moist, - cobblestoning, - PND, neg erythema or lesions  Neck: supple, Full range of motion, neg lymphadenopathy  Chest: full respiratory excursion no abnormal chest abnormality  Resp: clear to ascultation bilaterally  CV: RRR, neg MRG, brisk capillary refill  Abdomen: BS+, non tender, non distended  Ext:  Neg clubbing, cyanosis, pitting edema  Skin: Neg rashes or lesions  Lymph: neg supraclavicular, axillary     Assessment:       1. Chronic rhinitis    2. Pollen-food allergy, initial encounter    3. Chronic urticaria    4. Hypothyroidism, unspecified type    5. Simple chronic conjunctivitis of both eyes        Plan:       Chronic rhinitis  -     azelastine (ASTELIN) 137 mcg (0.1 %) nasal spray; 1 spray (137 mcg total) by Nasal route 2 (two) times daily.  Dispense: 30 mL; Refill: 3  -     budesonide (RINOCORT AQUA) 32 mcg/actuation nasal spray; 1 spray (32 mcg total) by Nasal route 2 (two) times daily.  Dispense: 8.6 g; Refill: 3  -     Cat Dander IgE; Future; Expected date: 03/07/2019  -     Dog dander IgE; Future; Expected date: 03/07/2019  -     Mouse Epithelium IgE; Future; Expected date: 03/07/2019  -     Penicillium IgE; Future; Expected date: 03/07/2019  -     Cladosporium IgE; Future; Expected date: 03/07/2019  -     Aspergillus fumagatus IgE; Future; Expected date: 03/07/2019  -     RAST Allergen Candida albicans; Future; Expected date: 03/07/2019  -     Allergen-Alternaria Alternata; Future; Expected date: 03/07/2019  -     D. pteronyssinus IgE; Future; Expected date: 03/07/2019  -     D. farinae IgE; Future; Expected  date: 03/07/2019  -     Allergen-Cockroach, [place]; Future; Expected date: 03/07/2019  -     RAST Allergen for Trichophyton rubrum; Future; Expected date: 03/07/2019  -     Allergen, E.Purpurascens; Future; Expected date: 03/07/2019  -     Curvularia lunata IgE; Future; Expected date: 03/07/2019  -     Bermuda grass IgE; Future; Expected date: 03/07/2019  -     David IgE; Future; Expected date: 03/07/2019  -     Gopi grass IgE; Future; Expected date: 03/07/2019  -     Bahia grass IgE; Future; Expected date: 03/07/2019  -     Ragweed, short, common IgE; Future; Expected date: 03/07/2019  -     RAST Allergen Maple ([place]); Future; Expected date: 03/07/2019  -     Allergen-Birch; Future; Expected date: 03/07/2019  -     Oak, white IgE; Future; Expected date: 03/07/2019  -     IgE Elm, American; Future; Expected date: 03/07/2019  -     Allergen-Maple [place]; Future; Expected date: 03/07/2019  -     Allergen-[place]; Future; Expected date: 03/07/2019  -     Allergen, White Adalid; Future; Expected date: 03/07/2019  -     Allergen, Pecan Tree IgE; Future; Expected date: 03/07/2019  -     Italian [place] IgE; Future; Expected date: 03/07/2019  -     RAST Allergen [place]; Future; Expected date: 03/07/2019  -     Privet, common IgE; Future; Expected date: 03/07/2019  -     RAST Allergen Sweet Gum; Future; Expected date: 03/07/2019  -     IgE [place]; Future; Expected date: 03/07/2019  -     Mugwort IgE; Future; Expected date: 03/07/2019  -     Plantain, English IgE; Future; Expected date: 03/07/2019  -     Thistle, Russian IgE; Future; Expected date: 03/07/2019  -     Allergen-Common Pigweed; Future; Expected date: 03/07/2019  -     [place] elder, rough IgE; Future; Expected date: 03/07/2019  -     RAST Allergen, Sheep [place](Yellow Dock); Future; Expected date: 03/07/2019  -     Nettle IgE; Future; Expected date: 03/07/2019  -     IgE Dockweed, Yellow; Future; Expected date: 03/07/2019  -     IgE Fennel,  Dog; Future; Expected date: 03/07/2019  -     montelukast (SINGULAIR) 10 mg tablet; Take 1 tablet (10 mg total) by mouth once daily.  Dispense: 90 tablet; Refill: 3    Pollen-food allergy, initial encounter    Chronic urticaria  -     hydrOXYzine HCl (ATARAX) 25 MG tablet; Take 1 tablet (25 mg total) by mouth every evening.  Dispense: 30 tablet; Refill: 1  -     levocetirizine (XYZAL) 5 MG tablet; Take 1 tablet (5 mg total) by mouth 2 (two) times daily.  Dispense: 60 tablet; Refill: 5  -     montelukast (SINGULAIR) 10 mg tablet; Take 1 tablet (10 mg total) by mouth once daily.  Dispense: 90 tablet; Refill: 3    Hypothyroidism, unspecified type    Simple chronic conjunctivitis of both eyes  -     bepotastine besilate (BEPREVE) 1.5 % Drop; Apply 1 drop to eye 2 (two) times daily.  Dispense: 10 mL; Refill: 2    start saline and combination therapy   Start bepreve   On beta blocker- not good candidate for ait unless she can be off of it x 48 hours.   xyzal bid for urticaria   Hydroxyzine qhs prn for itch or breakthrough.   [facility] serum IgE  Start montelukast 10 mg po qday     F/u 4 weeks         Stephanie Nieto M.D.  Allergy/Immunology  [facility]   [phone] phone  [phone] fax

## 2019-04-08 ENCOUNTER — PATIENT MESSAGE (OUTPATIENT)
Dept: ALLERGY | Facility: CLINIC | Age: 59
End: 2019-04-08

## 2019-05-13 DIAGNOSIS — L50.8 CHRONIC URTICARIA: ICD-10-CM

## 2019-05-21 RX ORDER — HYDROXYZINE HYDROCHLORIDE 25 MG/1
25 TABLET, FILM COATED ORAL NIGHTLY
Qty: 30 TABLET | Refills: 1 | Status: SHIPPED | OUTPATIENT
Start: 2019-05-21

## 2020-01-14 ENCOUNTER — OFFICE VISIT (OUTPATIENT)
Dept: ALLERGY | Facility: CLINIC | Age: 60
End: 2020-01-14
Payer: OTHER GOVERNMENT

## 2020-01-14 VITALS
WEIGHT: 212 LBS | OXYGEN SATURATION: 98 % | BODY MASS INDEX: 35.32 KG/M2 | HEIGHT: 65 IN | DIASTOLIC BLOOD PRESSURE: 86 MMHG | HEART RATE: 74 BPM | SYSTOLIC BLOOD PRESSURE: 120 MMHG

## 2020-01-14 DIAGNOSIS — R73.09 ELEVATED HEMOGLOBIN A1C: ICD-10-CM

## 2020-01-14 DIAGNOSIS — E03.9 HYPOTHYROIDISM, UNSPECIFIED TYPE: ICD-10-CM

## 2020-01-14 DIAGNOSIS — J31.0 CHRONIC RHINITIS: ICD-10-CM

## 2020-01-14 DIAGNOSIS — J01.90 ACUTE SINUSITIS, RECURRENCE NOT SPECIFIED, UNSPECIFIED LOCATION: ICD-10-CM

## 2020-01-14 DIAGNOSIS — L50.8 CHRONIC URTICARIA: Primary | ICD-10-CM

## 2020-01-14 PROCEDURE — 99215 OFFICE O/P EST HI 40 MIN: CPT | Mod: S$GLB,,, | Performed by: ALLERGY & IMMUNOLOGY

## 2020-01-14 PROCEDURE — 99215 PR OFFICE/OUTPT VISIT, EST, LEVL V, 40-54 MIN: ICD-10-PCS | Mod: S$GLB,,, | Performed by: ALLERGY & IMMUNOLOGY

## 2020-01-14 RX ORDER — OLOPATADINE HYDROCHLORIDE 1 MG/ML
1 SOLUTION/ DROPS OPHTHALMIC 2 TIMES DAILY
Qty: 5 ML | Refills: 3 | Status: SHIPPED | OUTPATIENT
Start: 2020-01-14 | End: 2023-01-18 | Stop reason: SDUPTHER

## 2020-01-14 RX ORDER — MONTELUKAST SODIUM 10 MG/1
10 TABLET ORAL NIGHTLY
Qty: 90 TABLET | Refills: 1 | Status: SHIPPED | OUTPATIENT
Start: 2020-01-14 | End: 2020-02-13

## 2020-01-14 RX ORDER — AZELASTINE 1 MG/ML
1 SPRAY, METERED NASAL 2 TIMES DAILY
Qty: 30 ML | Refills: 3 | Status: SHIPPED | OUTPATIENT
Start: 2020-01-14 | End: 2020-02-13

## 2020-01-14 RX ORDER — LEVOCETIRIZINE DIHYDROCHLORIDE 5 MG/1
5 TABLET, FILM COATED ORAL 2 TIMES DAILY
Qty: 60 TABLET | Refills: 5 | Status: SHIPPED | OUTPATIENT
Start: 2020-01-14 | End: 2023-08-17 | Stop reason: SDUPTHER

## 2020-01-14 RX ORDER — FLUTICASONE PROPIONATE 50 MCG
2 SPRAY, SUSPENSION (ML) NASAL DAILY PRN
Qty: 16 G | Refills: 3 | Status: SHIPPED | OUTPATIENT
Start: 2020-01-14 | End: 2021-10-22

## 2020-01-14 NOTE — PROGRESS NOTES
"Subjective:       Patient ID: Dennise Watkins is a 60 y.o. female.    Chief Complaint: Allergic Rhinitis  (follow up from March-did not get the labs-need new orders)    HPI     Pt presents for an allergy evaluation for rhinitis.      Lost to follow up from march 2019 until now 1/2020.    Rhinitis.   Condition: deteriorated   Onset: childhood  Sx: sneezing, pnd, conjunctivitis   Trigger: spring   Tx: flonase- not routine, xyzal- daily, patanol, tried hydroxyzine Friday that was her sister's.    Testing: dr goyal - non conclusive - tested when she was having urticaria.   - [facility] performed 2-3 years ago.     Hives:  Chronic history   Uncertain of triggers  xyzal helps   If out of system will have urticaria.   Has tried her sister's hydroxyzine.     Atopic Hx    Rhinitis: see above   Oral allergy: eggplant, ? 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 Veggies and fruits.   Latex: denies   Eczema: history of scalp dermatitis and face - not seeing derm.   Urticaria:   history of pressure induced urticaria. Still occurs "comes and goes." onset: childhood.   Nsaid: allergy to aleve "nose bleeds" not true allergy  Tolerates ibuprofen.     Infection History    Pneumonia # in the past 12 months: once in her life 30 yrs ago   Sinus infection # in the past 12 months: not recurrent 1-2 times per year    Otitis infection # in the past 12 months:  Denies     PMH:  Hypothyroid  htn      Review of Systems      General: neg unexpected weight changes, fevers, chills, night sweats, malaise  HEENT: see hpi, Neg eye pain, vision changes, ear drainage, nose bleeds, throat tightness, sores in the mouth  CV: Neg chest pain, palpitations, swelling  Resp: see hpi, neg shortness of breath, hemoptysis, cough  GI: see hpi, neg dysphagia, night abdominal pain, reflux, chronic diarrhea, chronic constipation  Derm: See Hpi, neg flushing  Mu/sk: Neg joint pain, joint swelling   Psych: Neg " "anxiety  neuro: neg chronic headaches, muscle weakness  Endo: neg heat/cold intolerance, chronic fatigue    Objective:     Vitals:    01/14/20 1435   BP: 120/86   Pulse: 74   SpO2: 98%   Weight: 96.2 kg (212 lb)   Height: 5' 5" (1.651 m)        Physical Exam      General: no acute distress, well developed well nourished   HEENT:   Head:normocephalic atraumatic  Eyes: SUMAN, EOMI, Neg injection, scleral icterus, or conjunctival papillary hypertrophy.  Ears: tm clear bilaterally, normal canal  Nose: 2-3+ inferior turbinates pink, neg nasal polyps            Mucosa: dry             Septal irritation: none   OP: mucus membranes moist, - cobblestoning, - PND, neg erythema or lesions  Neck: supple, Full range of motion, neg lymphadenopathy  Chest: full respiratory excursion no abnormal chest abnormality  Resp: clear to ascultation bilaterally  CV: RRR, neg MRG, brisk capillary refill  Abdomen: BS+, non tender, non distended  Ext:  Neg clubbing, cyanosis, pitting edema  Skin: Neg rashes or lesions  Lymph: neg supraclavicular, axillary     Assessment:       1. Chronic urticaria    2. Hypothyroidism, unspecified type    3. Chronic rhinitis    4. Acute sinusitis, recurrence not specified, unspecified location    5. Elevated hemoglobin A1c        Plan:       Chronic urticaria  -     levocetirizine (XYZAL) 5 MG tablet; Take 1 tablet (5 mg total) by mouth 2 (two) times daily.  Dispense: 60 tablet; Refill: 5  -     montelukast (SINGULAIR) 10 mg tablet; Take 1 tablet (10 mg total) by mouth every evening.  Dispense: 90 tablet; Refill: 1  -     C-reactive protein; Future; Expected date: 01/14/2020  -     Protein electrophoresis, serum; Future; Expected date: 01/14/2020    Hypothyroidism, unspecified type    Chronic rhinitis  -     azelastine (ASTELIN) 137 mcg (0.1 %) nasal spray; 1 spray (137 mcg total) by Nasal route 2 (two) times daily.  Dispense: 30 mL; Refill: 3  -     fluticasone propionate (FLONASE) 50 mcg/actuation nasal " spray; 2 sprays (100 mcg total) by Each Nostril route daily as needed for Rhinitis or Allergies.  Dispense: 16 g; Refill: 3  -     Cat Dander IgE; Future; Expected date: 01/14/2020  -     Dog dander IgE; Future; Expected date: 01/14/2020  -     Mouse Epithelium IgE; Future; Expected date: 01/14/2020  -     Penicillium IgE; Future; Expected date: 01/14/2020  -     Cladosporium IgE; Future; Expected date: 01/14/2020  -     Aspergillus fumagatus IgE; Future; Expected date: 01/14/2020  -     RAST Allergen Candida albicans; Future; Expected date: 01/14/2020  -     Allergen-Alternaria Alternata; Future; Expected date: 01/14/2020  -     D. pteronyssinus IgE; Future; Expected date: 01/14/2020  -     D. farinae IgE; Future; Expected date: 01/14/2020  -     Allergen-Cockroach, [place]; Future; Expected date: 01/14/2020  -     RAST Allergen for Trichophyton rubrum; Future; Expected date: 01/14/2020  -     Allergen, E.Purpurascens; Future; Expected date: 01/14/2020  -     Curvularia lunata IgE; Future; Expected date: 01/14/2020  -     Bermuda grass IgE; Future; Expected date: 01/14/2020  -     David IgE; Future; Expected date: 01/14/2020  -     Gopi grass IgE; Future; Expected date: 01/14/2020  -     Bahia grass IgE; Future; Expected date: 01/14/2020  -     Ragweed, short, common IgE; Future; Expected date: 01/14/2020  -     RAST Allergen Maple ([place]); Future; Expected date: 01/14/2020  -     Allergen-Birch; Future; Expected date: 01/14/2020  -     Oak, white IgE; Future; Expected date: 01/14/2020  -     IgE Elm, American; Future; Expected date: 01/14/2020  -     Allergen-Maple [place]/Salol; Future; Expected date: 01/14/2020  -     Allergen-[place]; Future; Expected date: 01/14/2020  -     Allergen, White Adalid; Future; Expected date: 01/14/2020  -     Allergen, Pecan Tree IgE; Future; Expected date: 01/14/2020  -     Italian [place] IgE; Future; Expected date: 01/14/2020  -     RAST Allergen [place]; Future;  Expected date: 01/14/2020  -     Privet, common IgE; Future; Expected date: 01/14/2020  -     RAST Allergen Sweet Gum; Future; Expected date: 01/14/2020  -     IgE [place]; Future; Expected date: 01/14/2020  -     Mugwort IgE; Future; Expected date: 01/14/2020  -     Plantain, English IgE; Future; Expected date: 01/14/2020  -     Thistle, Russian IgE; Future; Expected date: 01/14/2020  -     Allergen-Common Pigweed; Future; Expected date: 01/14/2020  -     Angel elder, rough IgE; Future; Expected date: 01/14/2020  -     RAST Allergen, Sheep [place]); Future; Expected date: 01/14/2020  -     Nettle IgE; Future; Expected date: 01/14/2020  -     IgE Dockweed, Yellow; Future; Expected date: 01/14/2020  -     IgE Fennel, Dog; Future; Expected date: 01/14/2020  -     olopatadine (PATANOL) 0.1 % ophthalmic solution; Place 1 drop into both eyes 2 (two) times daily.  Dispense: 5 mL; Refill: 3    Acute sinusitis, recurrence not specified, unspecified location  -     fluticasone propionate (FLONASE) 50 mcg/actuation nasal spray; 2 sprays (100 mcg total) by Each Nostril route daily as needed for Rhinitis or Allergies.  Dispense: 16 g; Refill: 3    Elevated hemoglobin A1c  -     Hemoglobin A1c; Future; Expected date: 01/14/2020    start saline and combination therapy   Start patnol for conjunctivitis   On beta blocker- not good candidate for ait unless she can be off of it x 48 hours.   xyzal bid for urticaria   Hydroxyzine qhs prn for itch or breakthrough.   [facility] serum IgE reordered 1/2020  Start montelukast 10 mg po qday     Reviewed labs with patient , [facility] to eval inflammation.     F/u 2-3 months, sooner if needed.         Stephanie Nieto M.D.  Allergy/Immunology  [place] [facility]   [phone] phone  [phone] fax

## 2020-01-14 NOTE — PATIENT INSTRUCTIONS
Nose:  Saline first 1-2 times per day  Next azelastine 1 spray per nare twice per day - if symptoms worsen, may use up to 4 times per day   Then fluticasone or rhinocort 1 spray per nare twice per day    Technique:  Head down  Aim up and out   [place] don't sniff      Montelukast daily to help with congestion and hives.     Hives:  Continue xyzal twice per day   You may add pepcid 20 mg twice per day to help with itch.     We may consider xolair if needed for hives.     Eyes:  patanol as needed twice per day for eye itch.     [facility] at [facility] non fasting.     Let's discuss more in detail in 2-3 months.

## 2020-02-17 LAB
A ALTERNATA IGE QN: <0.1 KU/L
A FUMIGATUS IGE QN: <0.1 KU/L
BAHIA GRASS IGE QN: 0.1 KU/L
BERMUDA GRASS IGE QN: <0.1 KU/L
BOXELDER IGE QN: <0.1 KU/L
C ALBICANS IGE QN: 0.31 KU/L
C HERBARUM IGE QN: <0.1 KU/L
C LUNATA IGE QN: <0.1 KU/L
CAT DANDER IGE QN: <0.1 KU/L
COMMON RAGWEED IGE QN: <0.1 KU/L
COTTONWOOD IGE QN: <0.1 KU/L
D FARINAE IGE QN: 6.67 KU/L
D PTERONYSS IGE QN: 2.68 KU/L
DEPRECATED A ALTERNATA IGE RAST QL: 0
DEPRECATED A FUMIGATUS IGE RAST QL: 0
DEPRECATED BAHIA GRASS IGE RAST QL: ABNORMAL
DEPRECATED BERMUDA GRASS IGE RAST QL: 0
DEPRECATED BOXELDER IGE RAST QL: 0
DEPRECATED C ALBICANS IGE RAST QL: ABNORMAL
DEPRECATED C HERBARUM IGE RAST QL: 0
DEPRECATED C LUNATA IGE RAST QL: 0
DEPRECATED CAT DANDER IGE RAST QL: 0
DEPRECATED COMMON RAGWEED IGE RAST QL: 0
DEPRECATED COTTONWOOD IGE RAST QL: 0
DEPRECATED D FARINAE IGE RAST QL: 3
DEPRECATED D PTERONYSS IGE RAST QL: 2
DEPRECATED DOG DANDER IGE RAST QL: 0
DEPRECATED E PURPURASCENS IGE RAST QL: 0
DEPRECATED ENGL PLANTAIN IGE RAST QL: ABNORMAL
DEPRECATED ITALIAN CYPRESS IGE RAST QL: ABNORMAL
DEPRECATED JOHNSON GRASS IGE RAST QL: 0
DEPRECATED LONDON PLANE IGE RAST QL: ABNORMAL
DEPRECATED MOUSE EPITH IGE RAST QL: 0
DEPRECATED MUGWORT IGE RAST QL: 0
DEPRECATED P NOTATUM IGE RAST QL: 0
DEPRECATED PECAN/HICK TREE IGE RAST QL: 0
DEPRECATED PRIVET IGE RAST QL: 0
DEPRECATED ROACH IGE RAST QL: ABNORMAL
DEPRECATED SALTWORT IGE RAST QL: 0
DEPRECATED SILVER BIRCH IGE RAST QL: 0
DEPRECATED SWEET GUM IGE RAST QL: 0
DEPRECATED T RUBRUM IGE RAST QL: 0
DEPRECATED TIMOTHY IGE RAST QL: 0
DEPRECATED WHITE ASH IGE RAST QL: 0
DEPRECATED WHITE ELM IGE RAST QL: 0
DEPRECATED WHITE MULBERRY IGE RAST QL: 0
DEPRECATED WHITE OAK IGE RAST QL: 0
DEPRECATED WORMWOOD IGE RAST QL: 0
DOG DANDER IGE QN: <0.1 KU/L
E PURPURASCENS IGE QN: <0.1 KU/L
ENGL PLANTAIN IGE QN: 0.11 KU/L
ITALIAN CYPRESS IGE QN: 0.28 KU/L
JOHNSON GRASS IGE QN: <0.1 KU/L
LONDON PLANE IGE QN: 0.15 KU/L
MOUSE EPITH IGE QN: <0.1 KU/L
MUGWORT IGE QN: <0.1 KU/L
P NOTATUM IGE QN: <0.1 KU/L
PECAN/HICK TREE IGE QN: <0.1 KU/L
PRIVET IGE QN: <0.1 KU/L
REF LAB TEST REF RANGE: NORMAL
ROACH IGE QN: 0.21 KU/L
SALTWORT IGE QN: <0.1 KU/L
SILVER BIRCH IGE QN: <0.1 KU/L
SWEET GUM IGE QN: <0.1 KU/L
T RUBRUM IGE QN: <0.1 KU/L
TIMOTHY IGE QN: <0.1 KU/L
WHITE ASH IGE QN: <0.1 KU/L
WHITE ELM IGE QN: <0.1 KU/L
WHITE MULBERRY IGE QN: <0.1 KU/L
WHITE OAK IGE QN: <0.1 KU/L
WORMWOOD IGE QN: <0.1 KU/L

## 2020-02-18 ENCOUNTER — PATIENT MESSAGE (OUTPATIENT)
Dept: ALLERGY | Facility: CLINIC | Age: 60
End: 2020-02-18

## 2020-07-09 DIAGNOSIS — L50.8 CHRONIC URTICARIA: ICD-10-CM

## 2020-07-21 RX ORDER — MONTELUKAST SODIUM 10 MG/1
TABLET ORAL
Qty: 90 TABLET | Refills: 1 | OUTPATIENT
Start: 2020-07-21

## 2020-07-24 ENCOUNTER — TELEPHONE (OUTPATIENT)
Dept: RHEUMATOLOGY | Facility: CLINIC | Age: 60
End: 2020-07-24

## 2020-07-24 NOTE — TELEPHONE ENCOUNTER
----- Message from Aidee Rodney sent at 7/24/2020 10:41 AM CDT -----  Regarding: [facility]  Contact: Dennise  Type: Patient Call Back    Who called:Dennise    What is the request in detail:Patient called to establish care. Please call to schedule    Can the clinic reply by [facility]?    Would the patient rather a call back or a response via [facility]? Call    Best call back number:[phone]

## 2020-08-03 PROBLEM — E11.9 CONTROLLED TYPE 2 DIABETES MELLITUS WITHOUT COMPLICATION, WITHOUT LONG-TERM CURRENT USE OF INSULIN: Status: ACTIVE | Noted: 2020-08-03

## 2020-08-27 PROBLEM — Z78.0 MENOPAUSE: Status: ACTIVE | Noted: 2020-08-27

## 2020-08-28 DIAGNOSIS — L50.8 CHRONIC URTICARIA: ICD-10-CM

## 2020-08-28 RX ORDER — MONTELUKAST SODIUM 10 MG/1
TABLET ORAL
Qty: 90 TABLET | Refills: 1 | OUTPATIENT
Start: 2020-08-28

## 2020-08-28 RX ORDER — LEVOCETIRIZINE DIHYDROCHLORIDE 5 MG/1
TABLET, FILM COATED ORAL
Qty: 180 TABLET | Refills: 1 | OUTPATIENT
Start: 2020-08-28

## 2021-03-17 ENCOUNTER — OFFICE VISIT (OUTPATIENT)
Dept: RHEUMATOLOGY | Facility: CLINIC | Age: 61
End: 2021-03-17
Payer: OTHER GOVERNMENT

## 2021-03-17 VITALS
WEIGHT: 224 LBS | DIASTOLIC BLOOD PRESSURE: 91 MMHG | HEIGHT: 65 IN | HEART RATE: 68 BPM | SYSTOLIC BLOOD PRESSURE: 142 MMHG | BODY MASS INDEX: 37.32 KG/M2

## 2021-03-17 DIAGNOSIS — L40.50 PSA (PSORIATIC ARTHRITIS): ICD-10-CM

## 2021-03-17 DIAGNOSIS — S00.96XA TICK BITE OF HEAD, INITIAL ENCOUNTER: ICD-10-CM

## 2021-03-17 DIAGNOSIS — L40.8 PSORIASIS WITH PUSTULES: Primary | ICD-10-CM

## 2021-03-17 DIAGNOSIS — M54.40 LOW BACK PAIN WITH SCIATICA, SCIATICA LATERALITY UNSPECIFIED, UNSPECIFIED BACK PAIN LATERALITY, UNSPECIFIED CHRONICITY: ICD-10-CM

## 2021-03-17 DIAGNOSIS — L80 VITILIGO: ICD-10-CM
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
## 2021-03-17 DIAGNOSIS — E11.51 DM (DIABETES MELLITUS) TYPE II, CONTROLLED, WITH PERIPHERAL VASCULAR DISORDER: ICD-10-CM

## 2021-03-17 PROCEDURE — 99999 PR PBB SHADOW E&M-EST. PATIENT-LVL III: ICD-10-PCS | Mod: PBBFAC,,, | Performed by: INTERNAL MEDICINE

## 2021-03-17 PROCEDURE — 99205 OFFICE O/P NEW HI 60 MIN: CPT | Mod: S$GLB,,, | Performed by: INTERNAL MEDICINE

## 2021-03-17 PROCEDURE — 99205 PR OFFICE/OUTPT VISIT, NEW, LEVL V, 60-74 MIN: ICD-10-PCS | Mod: S$GLB,,, | Performed by: INTERNAL MEDICINE

## 2021-03-17 PROCEDURE — 99999 PR PBB SHADOW E&M-EST. PATIENT-LVL III: CPT | Mod: PBBFAC,,, | Performed by: INTERNAL MEDICINE

## 2021-03-17 RX ORDER — BACLOFEN 20 MG/1
20 TABLET ORAL 3 TIMES DAILY
Qty: 90 TABLET | Refills: 11 | Status: SHIPPED | OUTPATIENT
Start: 2021-03-17 | End: 2022-05-21

## 2021-03-17 RX ORDER — DOXYCYCLINE HYCLATE 100 MG
100 TABLET ORAL 2 TIMES DAILY
Qty: 20 TABLET | Refills: 0 | Status: SHIPPED | OUTPATIENT
Start: 2021-03-17 | End: 2021-03-27

## 2021-03-17 RX ORDER — CLOBETASOL PROPIONATE 0.5 MG/G
OINTMENT TOPICAL 2 TIMES DAILY
Qty: 45 G | Refills: 0 | Status: SHIPPED | OUTPATIENT
Start: 2021-03-17 | End: 2021-10-22

## 2021-03-17 RX ORDER — IVERMECTIN 3 MG/1
TABLET ORAL
COMMUNITY
Start: 2021-03-03 | End: 2021-10-22

## 2021-03-17 RX ORDER — KETOCONAZOLE 20 MG/G
1 CREAM TOPICAL DAILY
COMMUNITY
Start: 2021-03-03 | End: 2022-08-24 | Stop reason: SDUPTHER

## 2021-03-17 ASSESSMENT — ROUTINE ASSESSMENT OF PATIENT INDEX DATA (RAPID3)
PATIENT GLOBAL ASSESSMENT SCORE: 6.5
TOTAL RAPID3 SCORE: 5.11
PAIN SCORE: 6.5
FATIGUE SCORE: 1.1
PSYCHOLOGICAL DISTRESS SCORE: 2.2
MDHAQ FUNCTION SCORE: 0.7

## 2021-03-24 LAB
25(OH)D3 SERPL-MCNC: 28 NG/ML (ref 30–100)
ANA PAT SER IF-IMP: ABNORMAL
ANA SER QL IF: POSITIVE
ANA TITR SER IF: ABNORMAL TITER
B BURGDOR AB SER IA-ACNC: <0.9 INDEX
CCP IGG SERPL-ACNC: <16 UNITS
CRP SERPL-MCNC: 22 MG/L
DSDNA AB SER-ACNC: <1 IU/ML
ENA SCL70 AB SER IA-ACNC: NORMAL AI
HBA1C MFR BLD: 6.2 % OF TOTAL HGB
HLA AG-IMP: NORMAL
HLA-B27 QL FC: NEGATIVE
HLA-DQ2 QL: NEGATIVE
HLA-DQ8 QL: NEGATIVE
HLA-DQA1 QL: 3
HLA-DQA1 QL: 5
HLA-DQB1 QL: 301
HLA-DQB1 QL: 301
IGE SERPL-ACNC: 337 KU/L
IGM SERPL-MCNC: 116 MG/DL (ref 50–300)
RESULTS REVIEWED BY: NORMAL
RHEUMATOID FACT SERPL-ACNC: <14 IU/ML
T4 FREE SERPL-MCNC: 1 NG/DL (ref 0.8–1.8)
THYROGLOB AB SERPL-ACNC: <1 IU/ML
THYROPEROXIDASE AB SERPL-ACNC: <1 IU/ML
TSH SERPL-ACNC: 1.73 MIU/L (ref 0.4–4.5)

## 2021-03-28 LAB
ALBUMIN SERPL-MCNC: 4.3 G/DL (ref 3.6–5.1)
ALBUMIN/GLOB SERPL: 1.7 (CALC) (ref 1–2.5)
ALP SERPL-CCNC: 97 U/L (ref 37–153)
ALT SERPL-CCNC: 22 U/L (ref 6–29)
AST SERPL-CCNC: 21 U/L (ref 10–35)
B BURGDOR IGG SER QL IB: NEGATIVE
B BURGDOR IGM SER QL IB: NEGATIVE
B BURGDOR18KD IGG SER QL IB: NORMAL
B BURGDOR23KD IGG SER QL IB: NORMAL
B BURGDOR23KD IGM SER QL IB: NORMAL
B BURGDOR28KD IGG SER QL IB: NORMAL
B BURGDOR30KD IGG SER QL IB: NORMAL
B BURGDOR39KD IGG SER QL IB: NORMAL
B BURGDOR39KD IGM SER QL IB: NORMAL
B BURGDOR41KD IGG SER QL IB: NORMAL
B BURGDOR41KD IGM SER QL IB: NORMAL
B BURGDOR45KD IGG SER QL IB: NORMAL
B BURGDOR58KD IGG SER QL IB: NORMAL
B BURGDOR66KD IGG SER QL IB: NORMAL
B BURGDOR93KD IGG SER QL IB: NORMAL
BASOPHILS # BLD AUTO: 53 CELLS/UL (ref 0–200)
BASOPHILS NFR BLD AUTO: 0.6 %
BILIRUB SERPL-MCNC: 0.4 MG/DL (ref 0.2–1.2)
BUN SERPL-MCNC: 16 MG/DL (ref 7–25)
BUN/CREAT SERPL: NORMAL (CALC) (ref 6–22)
C3 SERPL-MCNC: 162 MG/DL (ref 83–193)
C4 SERPL-MCNC: 37 MG/DL (ref 15–57)
CALCIUM SERPL-MCNC: 10.2 MG/DL (ref 8.6–10.4)
CHLORIDE SERPL-SCNC: 103 MMOL/L (ref 98–110)
CO2 SERPL-SCNC: 27 MMOL/L (ref 20–32)
CREAT SERPL-MCNC: 0.64 MG/DL (ref 0.5–0.99)
ENA SM AB SER IA-ACNC: NORMAL AI
ENA SM+RNP AB SER IA-ACNC: NORMAL AI
ENA SS-A AB SER IA-ACNC: NORMAL AI
ENA SS-B AB SER IA-ACNC: NORMAL AI
EOSINOPHIL # BLD AUTO: 202 CELLS/UL (ref 15–500)
EOSINOPHIL NFR BLD AUTO: 2.3 %
ERYTHROCYTE [DISTWIDTH] IN BLOOD BY AUTOMATED COUNT: 14.4 % (ref 11–15)
ERYTHROCYTE [SEDIMENTATION RATE] IN BLOOD BY WESTERGREN METHOD: 17 MM/H
GFRSERPLBLD MDRD-ARVRAT: 96 ML/MIN/1.73M2
GLOBULIN SER CALC-MCNC: 2.5 G/DL (CALC) (ref 1.9–3.7)
GLUCOSE SERPL-MCNC: 129 MG/DL (ref 65–139)
HCT VFR BLD AUTO: 44 % (ref 35–45)
HGB BLD-MCNC: 14.1 G/DL (ref 11.7–15.5)
HISTONE AB SER IA-ACNC: <1 U
IGA SERPL-MCNC: 133 MG/DL (ref 70–320)
IGG SERPL-MCNC: 834 MG/DL (ref 600–1540)
IGG1 SER-MCNC: 463 MG/DL (ref 382–929)
IGG2 SER-MCNC: 215 MG/DL (ref 241–700)
IGG3 SER-MCNC: 47 MG/DL (ref 22–178)
IGG4 SER-MCNC: 52.8 MG/DL (ref 4–86)
LYMPHOCYTES # BLD AUTO: 3018 CELLS/UL (ref 850–3900)
LYMPHOCYTES NFR BLD AUTO: 34.3 %
MCH RBC QN AUTO: 25.9 PG (ref 27–33)
MCHC RBC AUTO-ENTMCNC: 32 G/DL (ref 32–36)
MCV RBC AUTO: 80.9 FL (ref 80–100)
MONOCYTES # BLD AUTO: 378 CELLS/UL (ref 200–950)
MONOCYTES NFR BLD AUTO: 4.3 %
NEUTROPHILS # BLD AUTO: 5148 CELLS/UL (ref 1500–7800)
NEUTROPHILS NFR BLD AUTO: 58.5 %
PLATELET # BLD AUTO: 365 THOUSAND/UL (ref 140–400)
PMV BLD REES-ECKER: 11.9 FL (ref 7.5–12.5)
POTASSIUM SERPL-SCNC: 4.1 MMOL/L (ref 3.5–5.3)
PROT SERPL-MCNC: 6.8 G/DL (ref 6.1–8.1)
RBC # BLD AUTO: 5.44 MILLION/UL (ref 3.8–5.1)
SODIUM SERPL-SCNC: 139 MMOL/L (ref 135–146)
T3FREE SERPL-MCNC: 2.8 PG/ML (ref 2.3–4.2)
VZV IGG SER IA-ACNC: 836.5 INDEX
VZV IGM SER IA-ACNC: 0.36
WBC # BLD AUTO: 8.8 THOUSAND/UL (ref 3.8–10.8)

## 2021-05-10 ENCOUNTER — PATIENT MESSAGE (OUTPATIENT)
Dept: RESEARCH | Facility: HOSPITAL | Age: 61
End: 2021-05-10

## 2021-06-08 ENCOUNTER — OFFICE VISIT (OUTPATIENT)
Dept: RHEUMATOLOGY | Facility: CLINIC | Age: 61
End: 2021-06-08
Payer: OTHER GOVERNMENT

## 2021-06-08 ENCOUNTER — TELEPHONE (OUTPATIENT)
Dept: RHEUMATOLOGY | Facility: CLINIC | Age: 61
End: 2021-06-08

## 2021-06-08 VITALS
SYSTOLIC BLOOD PRESSURE: 121 MMHG | DIASTOLIC BLOOD PRESSURE: 84 MMHG | WEIGHT: 224 LBS | HEIGHT: 65 IN | BODY MASS INDEX: 37.32 KG/M2 | HEART RATE: 75 BPM

## 2021-06-08 DIAGNOSIS — G89.29 CHRONIC LOW BACK PAIN WITHOUT SCIATICA, UNSPECIFIED BACK PAIN LATERALITY: ICD-10-CM

## 2021-06-08 DIAGNOSIS — E11.51 DM (DIABETES MELLITUS) TYPE II, CONTROLLED, WITH PERIPHERAL VASCULAR DISORDER: ICD-10-CM

## 2021-06-08 DIAGNOSIS — L40.8 PSORIASIS WITH PUSTULES: Primary | ICD-10-CM

## 2021-06-08 DIAGNOSIS — L80 VITILIGO: ICD-10-CM

## 2021-06-08 DIAGNOSIS — L40.50 PSA (PSORIATIC ARTHRITIS): ICD-10-CM

## 2021-06-08 DIAGNOSIS — M54.50 CHRONIC LOW BACK PAIN WITHOUT SCIATICA, UNSPECIFIED BACK PAIN LATERALITY: ICD-10-CM

## 2021-06-08 DIAGNOSIS — B37.9 CANDIDIASIS: ICD-10-CM

## 2021-06-08 PROCEDURE — 99999 PR PBB SHADOW E&M-EST. PATIENT-LVL III: ICD-10-PCS | Mod: PBBFAC,,, | Performed by: INTERNAL MEDICINE

## 2021-06-08 PROCEDURE — 99215 OFFICE O/P EST HI 40 MIN: CPT | Mod: S$GLB,,, | Performed by: INTERNAL MEDICINE

## 2021-06-08 PROCEDURE — 99999 PR PBB SHADOW E&M-EST. PATIENT-LVL III: CPT | Mod: PBBFAC,,, | Performed by: INTERNAL MEDICINE

## 2021-06-08 PROCEDURE — 99215 PR OFFICE/OUTPT VISIT, EST, LEVL V, 40-54 MIN: ICD-10-PCS | Mod: S$GLB,,, | Performed by: INTERNAL MEDICINE

## 2021-06-08 RX ORDER — MELOXICAM 15 MG/1
15 TABLET ORAL DAILY
Qty: 30 TABLET | Refills: 6 | Status: SHIPPED | OUTPATIENT
Start: 2021-06-08 | End: 2021-07-08

## 2021-06-08 RX ORDER — FLUCONAZOLE 150 MG/1
150 TABLET ORAL DAILY
Qty: 7 TABLET | Refills: 3 | Status: SHIPPED | OUTPATIENT
Start: 2021-06-08 | End: 2021-06-15

## 2021-06-08 ASSESSMENT — ROUTINE ASSESSMENT OF PATIENT INDEX DATA (RAPID3)
PSYCHOLOGICAL DISTRESS SCORE: 0
MDHAQ FUNCTION SCORE: 0.7
FATIGUE SCORE: 0
PAIN SCORE: 4
TOTAL RAPID3 SCORE: 3.11
PATIENT GLOBAL ASSESSMENT SCORE: 3

## 2021-06-29 ENCOUNTER — TELEPHONE (OUTPATIENT)
Dept: RHEUMATOLOGY | Facility: CLINIC | Age: 61
End: 2021-06-29

## 2021-07-27 ENCOUNTER — PATIENT MESSAGE (OUTPATIENT)
Dept: RHEUMATOLOGY | Facility: CLINIC | Age: 61
End: 2021-07-27

## 2021-08-04 ENCOUNTER — TELEPHONE (OUTPATIENT)
Dept: RHEUMATOLOGY | Facility: CLINIC | Age: 61
End: 2021-08-04

## 2021-08-05 ENCOUNTER — TELEPHONE (OUTPATIENT)
Dept: RHEUMATOLOGY | Facility: CLINIC | Age: 61
End: 2021-08-05

## 2021-08-09 ENCOUNTER — TELEPHONE (OUTPATIENT)
Dept: RHEUMATOLOGY | Facility: CLINIC | Age: 61
End: 2021-08-09

## 2021-08-16 ENCOUNTER — TELEPHONE (OUTPATIENT)
Dept: RHEUMATOLOGY | Facility: CLINIC | Age: 61
End: 2021-08-16

## 2021-12-28 ENCOUNTER — TELEPHONE (OUTPATIENT)
Dept: RHEUMATOLOGY | Facility: CLINIC | Age: 61
End: 2021-12-28
Payer: OTHER GOVERNMENT

## 2022-01-28 ENCOUNTER — OFFICE VISIT (OUTPATIENT)
Dept: RHEUMATOLOGY | Facility: CLINIC | Age: 62
End: 2022-01-28
Payer: COMMERCIAL

## 2022-01-28 VITALS
HEIGHT: 65 IN | SYSTOLIC BLOOD PRESSURE: 117 MMHG | BODY MASS INDEX: 35.32 KG/M2 | HEART RATE: 89 BPM | DIASTOLIC BLOOD PRESSURE: 83 MMHG | WEIGHT: 212 LBS

## 2022-01-28 DIAGNOSIS — Z01.84 ANTIBODY RESPONSE EXAM: ICD-10-CM

## 2022-01-28 DIAGNOSIS — R79.82 ELEVATED C-REACTIVE PROTEIN (CRP): ICD-10-CM

## 2022-01-28 DIAGNOSIS — L40.50 PSA (PSORIATIC ARTHRITIS): Primary | ICD-10-CM

## 2022-01-28 DIAGNOSIS — K21.9 GASTROESOPHAGEAL REFLUX DISEASE, UNSPECIFIED WHETHER ESOPHAGITIS PRESENT: ICD-10-CM

## 2022-01-28 DIAGNOSIS — E11.9 CONTROLLED TYPE 2 DIABETES MELLITUS WITHOUT COMPLICATION, WITHOUT LONG-TERM CURRENT USE OF INSULIN: ICD-10-CM

## 2022-01-28 PROCEDURE — 99999 PR PBB SHADOW E&M-EST. PATIENT-LVL V: ICD-10-PCS | Mod: PBBFAC,,, | Performed by: PHYSICIAN ASSISTANT

## 2022-01-28 PROCEDURE — 99214 OFFICE O/P EST MOD 30 MIN: CPT | Mod: S$GLB,,, | Performed by: PHYSICIAN ASSISTANT

## 2022-01-28 PROCEDURE — 99999 PR PBB SHADOW E&M-EST. PATIENT-LVL V: CPT | Mod: PBBFAC,,, | Performed by: PHYSICIAN ASSISTANT

## 2022-01-28 PROCEDURE — 99214 PR OFFICE/OUTPT VISIT, EST, LEVL IV, 30-39 MIN: ICD-10-PCS | Mod: S$GLB,,, | Performed by: PHYSICIAN ASSISTANT

## 2022-01-28 RX ORDER — FAMOTIDINE 40 MG/1
40 TABLET, FILM COATED ORAL NIGHTLY PRN
Qty: 30 TABLET | Refills: 5 | Status: SHIPPED | OUTPATIENT
Start: 2022-01-28 | End: 2023-06-16

## 2022-01-28 ASSESSMENT — ROUTINE ASSESSMENT OF PATIENT INDEX DATA (RAPID3)
PATIENT GLOBAL ASSESSMENT SCORE: 3
FATIGUE SCORE: 1.1
TOTAL RAPID3 SCORE: 2.44
MDHAQ FUNCTION SCORE: 0.4
PAIN SCORE: 3
PSYCHOLOGICAL DISTRESS SCORE: 0

## 2022-01-28 NOTE — PROGRESS NOTES
Subjective:       Patient ID: Dennise Watkins is a 62 y.o. female.    Chief Complaint: Disease Management    Mrs. Watkins is a 62 year old female who presents to clinic for follow up on psoriatic arthritis. She is a new patient to me. She was prescribed mobic at her last visit, but she has not started this yet. She is taking OTC ibuprofen 400 mg PRN and baclofen PRN, which is quite helpful for her symptoms. She has uncontrolled GERD despite PPI and wants to avoid additional NSAIDs if possible. She has joint pain in her bilateral shoulders associated with significant stiffness. 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 She has generalized pain throughout her body. Joint pain is also migratory--from shoulders, knee, to her feet. She wants to repeat inflammatory markers at this time to assess her current clinical status.     She was hospitalized for covid in August. She is having some consequences of covid--with worsening GERD, tachycardia, and bladder sx.    Review of Systems   Constitutional: Positive for activity change. Negative for appetite change, chills, fatigue and fever.   Eyes: Negative for visual disturbance.   Respiratory: Negative for cough and shortness of breath.    Cardiovascular: Negative for chest pain, palpitations and leg swelling.   Gastrointestinal: Negative for abdominal pain, constipation, diarrhea, nausea and vomiting.   Musculoskeletal: Positive for arthralgias.   Neurological: Negative for dizziness, weakness, light-headedness and headaches.         Objective:     Vitals:    01/28/22 1314   BP: 117/83   Pulse: 89       Past Medical History:   Diagnosis Date    Abnormal blood chemistry     ADD (attention deficit disorder)     Anemia     Anxiety disorder     Arthralgia     Breast mass, left     Diabetes mellitus     Dysuria     Epistaxis     Essential hypertension, benign     Fatigue     GERD (gastroesophageal reflux disease)     Hiatal hernia     History of chicken pox      Hyperglycemia     Hypertension     Hypokalemia     Neck pain     Paresthesia     Pruritic disorder     Thyroid disease     Weight gain      Past Surgical History:   Procedure Laterality Date    ABLATION N/A 9/28/2018    Procedure: Ablation;  Surgeon: Bunny Humphreys MD;  Location: [facility] CATH LAB;  Service: Cardiology;  Laterality: N/A;  SVT ,RFA, CHELE, MAC, SK ,3 Prep    ESOPHAGOGASTRODUODENOSCOPY      HYSTERECTOMY  2000    OOPHORECTOMY  2000    RHINOPLASTY TIP      TONSILLECTOMY, ADENOIDECTOMY            Physical Exam   Constitutional: She is well-developed, well-nourished, and in no distress.   Eyes: Right conjunctiva is not injected. Left conjunctiva is not injected. Right eye exhibits normal extraocular motion. Left eye exhibits normal extraocular motion.   Neck: No JVD present. No thyromegaly present.   Cardiovascular: Normal rate and regular rhythm. Exam reveals no decreased pulses.   Pulmonary/Chest: She has no wheezes. She has no rhonchi. She has no rales.   Abdominal: There is no hepatosplenomegaly.   Musculoskeletal:      Right shoulder: Tenderness present.      Left shoulder: Tenderness present.      Right elbow: Normal.      Left elbow: Normal.      Right wrist: Normal.      Left wrist: Normal.      Right knee: Normal.      Left knee: Tenderness present.   Lymphadenopathy:     She has no cervical adenopathy.   Neurological: Gait normal.   Skin: No rash noted. No cyanosis.   Psychiatric: Mood and affect normal.       Right Side Rheumatological Exam     Examination finds the elbow, wrist, knee, 1st PIP, 1st MCP, 2nd PIP, 2nd MCP, 3rd PIP, 3rd MCP, 4th PIP, 4th MCP, 5th PIP and 5th MCP normal.    The patient is tender to palpation of the shoulder    Left Side Rheumatological Exam     Examination finds the elbow, wrist, 1st PIP, 1st MCP, 2nd PIP, 2nd MCP, 3rd PIP, 3rd MCP, 4th PIP, 4th MCP, 5th PIP and 5th MCP normal.    The patient is tender to palpation of the shoulder and knee.           Component      Latest Ref Rng & Units 3/23/2021 3/18/2021   WBC      3.8 - 10.8 Thousand/uL 8.8    RBC      3.80 - 5.10 Million/uL 5.44 (H)    Hemoglobin      11.7 - 15.5 g/dL 14.1    Hematocrit      35.0 - 45.0 % 44.0    MCV      80.0 - 100.0 fL 80.9    MCH      27.0 - 33.0 pg 25.9 (L)    MCHC      32.0 - 36.0 g/dL 32.0    RDW      11.0 - 15.0 % 14.4    Platelets      140 - 400 Thousand/uL 365    MPV      7.5 - 12.5 fL 11.9    Neutrophils, Abs      1,500 - 7,800 cells/uL 5,148    Lymph #      850 - 3,900 cells/uL 3,018    Mono #      200 - 950 cells/uL 378    Eos #      15 - 500 cells/uL 202    Baso #      0 - 200 cells/uL 53    Neutrophils Relative      % 58.5    Lymph %      % 34.3    Mono %      % 4.3    Eosinophil %      % 2.3    Basophil %      % 0.6    Glucose      65 - 139 mg/dL 129    BUN      7 - 25 mg/dL 16    Creatinine      0.50 - 0.99 mg/dL 0.64    eGFR if non       > OR = 60 mL/min/1.73m2 96    eGFR if       > OR = 60 mL/min/1.73m2 112    BUN/CREAT RATIO      6 - 22 (calc) NOT APPLICABLE    Sodium      135 - 146 mmol/L 139    Potassium      3.5 - 5.3 mmol/L 4.1    Chloride      98 - 110 mmol/L 103    CO2      20 - 32 mmol/L 27    Calcium      8.6 - 10.4 mg/dL 10.2    PROTEIN TOTAL      6.1 - 8.1 g/dL 6.8    Albumin      3.6 - 5.1 g/dL 4.3    Globulin, Total      1.9 - 3.7 g/dL (calc) 2.5    Albumin/Globulin Ratio      1.0 - 2.5 (calc) 1.7    BILIRUBIN TOTAL      0.2 - 1.2 mg/dL 0.4    Alkaline Phosphatase      37 - 153 U/L 97    AST      10 - 35 U/L 21    ALT      6 - 29 U/L 22    Lyme Disease Ab (IGG) WB      NEGATIVE NEGATIVE    18 KD (IGG) Band       NON-REACTIVE    23 KD (IGG) Band       NON-REACTIVE    28 KD (IGG) Band       NON-REACTIVE    30 Kd (IGG) Band       NON-REACTIVE    39 KD (IGG) Band       NON-REACTIVE    41 KD (IGG) Band       NON-REACTIVE    45 KD (IGG) Band       NON-REACTIVE    58 KD (IGG) Band       NON-REACTIVE    66 KD (IGG) Band        NON-REACTIVE    93 KD (IGG) Band       NON-REACTIVE    Lyme Disease Ab (IGM) WB      NEGATIVE NEGATIVE    23 KD (IGM) Band       NON-REACTIVE    39 KD (IGM) Band       NON-REACTIVE    41 KD (IGM) Band       NON-REACTIVE    Immunoglobulin G Subclass 1      382 - 929 mg/dL 463    Immunoglobulin G Subclass 2      241 - 700 mg/dL 215 (L)    Immunoglobulin G Subclass 3      22 - 178 mg/dL 47    Immunoglobulin G Sublcass 4      4 - 86 mg/dL 52.8    Immunoglobulin G, Serum      600 - 1,540 mg/dL 834    Anti Sm Antibody      <1.0 NEG AI <1.0 NEG    SM/RNP Antibody      <1.0 NEG AI <1.0 NEG    JODI      NEGATIVE  POSITIVE (A)   ds DNA Ab      IU/mL  <1   SCL-70 Antibody      <1.0 NEG AI  <1.0 NEG   Thyroglobulin Ab Screen      < or = 1 IU/mL  <1   CRP      <8.0 mg/L  22.0 (H)   Cyclic Citrullinated Peptide (CCP) Ab (IgG)      UNITS  <16   TSH      0.40 - 4.50 mIU/L  1.73   Thyroid Peroxidase Ab      <9 IU/mL  <1   T4, Free      0.8 - 1.8 ng/dL  1.0   T3, Free      2.3 - 4.2 pg/mL 2.8    Anti-SSB Antibody      <1.0 NEG AI <1.0 NEG    Anti-SSA Antibody      <1.0 NEG AI <1.0 NEG    IgA      70 - 320 mg/dL 133    Varicella IgM       0.36    Varicella IgG      index 836.50    Histone Ab      U <1.0    Sed Rate      < OR = 30 mm/h 17    Complement (C-3)      83 - 193 mg/dL 162    Complement (C-4)      15 - 57 mg/dL 37         Assessment:       1. PSA (psoriatic arthritis)    2. Elevated C-reactive protein (CRP)    3. Controlled type 2 diabetes mellitus without complication, without long-term current use of insulin    4. Antibody response exam    5. Gastroesophageal reflux disease, unspecified whether esophagitis present            Plan:       PSA (psoriatic arthritis)  -     CBC Auto Differential; Future; Expected date: 01/28/2022  -     Comprehensive Metabolic Panel; Future; Expected date: 01/28/2022  -     C-Reactive Protein; Future; Expected date: 01/28/2022  -     Sedimentation rate; Future; Expected date:  01/28/2022    Elevated C-reactive protein (CRP)    Controlled type 2 diabetes mellitus without complication, without long-term current use of insulin  -     Hemoglobin A1C; Future; Expected date: 01/28/2022    Antibody response exam  -     COVID-19 (SARS CoV-2) IgG Antibody; Future; Expected date: 01/28/2022    Gastroesophageal reflux disease, unspecified whether esophagitis present  -     famotidine (PEPCID) 40 MG tablet; Take 1 tablet (40 mg total) by mouth nightly as needed for Heartburn.  Dispense: 30 tablet; Refill: 5        Assessment:  62 year old female with  Psoriatic arthritis, elevated CRP  --history of covid infection  --controlled type 2 diabetes, HgbA1c 6.3%  --GERD    Plan:  1. Check labs  2. Add famotidine at bedtime. May consider mobic in the future  3. Consider SSZ or otezla    Follow up:  2 weeks to review labs

## 2022-02-14 LAB
ALBUMIN SERPL-MCNC: 4.3 G/DL (ref 3.6–5.1)
ALBUMIN/GLOB SERPL: 1.6 (CALC) (ref 1–2.5)
ALP SERPL-CCNC: 106 U/L (ref 37–153)
ALT SERPL-CCNC: 27 U/L (ref 6–29)
AST SERPL-CCNC: 29 U/L (ref 10–35)
BASOPHILS # BLD AUTO: 51 CELLS/UL (ref 0–200)
BASOPHILS NFR BLD AUTO: 0.7 %
BILIRUB SERPL-MCNC: 0.6 MG/DL (ref 0.2–1.2)
BUN SERPL-MCNC: 17 MG/DL (ref 7–25)
BUN/CREAT SERPL: ABNORMAL (CALC) (ref 6–22)
CALCIUM SERPL-MCNC: 10.4 MG/DL (ref 8.6–10.4)
CHLORIDE SERPL-SCNC: 100 MMOL/L (ref 98–110)
CO2 SERPL-SCNC: 27 MMOL/L (ref 20–32)
CREAT SERPL-MCNC: 0.91 MG/DL (ref 0.5–0.99)
CRP SERPL-MCNC: 29.2 MG/L
EOSINOPHIL # BLD AUTO: 110 CELLS/UL (ref 15–500)
EOSINOPHIL NFR BLD AUTO: 1.5 %
ERYTHROCYTE [DISTWIDTH] IN BLOOD BY AUTOMATED COUNT: 14.1 % (ref 11–15)
ERYTHROCYTE [SEDIMENTATION RATE] IN BLOOD BY WESTERGREN METHOD: 25 MM/H
GLOBULIN SER CALC-MCNC: 2.7 G/DL (CALC) (ref 1.9–3.7)
GLUCOSE SERPL-MCNC: 169 MG/DL (ref 65–99)
HBA1C MFR BLD: 7 % OF TOTAL HGB
HCT VFR BLD AUTO: 41 % (ref 35–45)
HGB BLD-MCNC: 13.7 G/DL (ref 11.7–15.5)
LYMPHOCYTES # BLD AUTO: 1978 CELLS/UL (ref 850–3900)
LYMPHOCYTES NFR BLD AUTO: 27.1 %
MCH RBC QN AUTO: 26.7 PG (ref 27–33)
MCHC RBC AUTO-ENTMCNC: 33.4 G/DL (ref 32–36)
MCV RBC AUTO: 79.8 FL (ref 80–100)
MONOCYTES # BLD AUTO: 299 CELLS/UL (ref 200–950)
MONOCYTES NFR BLD AUTO: 4.1 %
NEUTROPHILS # BLD AUTO: 4862 CELLS/UL (ref 1500–7800)
NEUTROPHILS NFR BLD AUTO: 66.6 %
PLATELET # BLD AUTO: 342 THOUSAND/UL (ref 140–400)
PMV BLD REES-ECKER: 10.5 FL (ref 7.5–12.5)
POTASSIUM SERPL-SCNC: 4 MMOL/L (ref 3.5–5.3)
PROT SERPL-MCNC: 7 G/DL (ref 6.1–8.1)
RBC # BLD AUTO: 5.14 MILLION/UL (ref 3.8–5.1)
SARS-COV-2 IGG SERPL IA-ACNC: 21.56 INDEX
SODIUM SERPL-SCNC: 138 MMOL/L (ref 135–146)
WBC # BLD AUTO: 7.3 THOUSAND/UL (ref 3.8–10.8)

## 2022-02-15 ENCOUNTER — OFFICE VISIT (OUTPATIENT)
Dept: RHEUMATOLOGY | Facility: CLINIC | Age: 62
End: 2022-02-15
Payer: OTHER GOVERNMENT

## 2022-02-15 ENCOUNTER — TELEPHONE (OUTPATIENT)
Dept: RHEUMATOLOGY | Facility: CLINIC | Age: 62
End: 2022-02-15

## 2022-02-15 VITALS
HEIGHT: 65 IN | HEART RATE: 91 BPM | WEIGHT: 220 LBS | SYSTOLIC BLOOD PRESSURE: 129 MMHG | BODY MASS INDEX: 36.65 KG/M2 | DIASTOLIC BLOOD PRESSURE: 83 MMHG

## 2022-02-15 DIAGNOSIS — B37.9 YEAST INFECTION: ICD-10-CM

## 2022-02-15 DIAGNOSIS — L40.50 PSA (PSORIATIC ARTHRITIS): Primary | ICD-10-CM

## 2022-02-15 DIAGNOSIS — R79.82 ELEVATED C-REACTIVE PROTEIN (CRP): ICD-10-CM

## 2022-02-15 PROCEDURE — 99214 PR OFFICE/OUTPT VISIT, EST, LEVL IV, 30-39 MIN: ICD-10-PCS | Mod: S$GLB,,, | Performed by: PHYSICIAN ASSISTANT

## 2022-02-15 PROCEDURE — 99214 OFFICE O/P EST MOD 30 MIN: CPT | Mod: S$GLB,,, | Performed by: PHYSICIAN ASSISTANT

## 2022-02-15 PROCEDURE — 99999 PR PBB SHADOW E&M-EST. PATIENT-LVL V: ICD-10-PCS | Mod: PBBFAC,,, | Performed by: PHYSICIAN ASSISTANT

## 2022-02-15 PROCEDURE — 99999 PR PBB SHADOW E&M-EST. PATIENT-LVL V: CPT | Mod: PBBFAC,,, | Performed by: PHYSICIAN ASSISTANT

## 2022-02-15 RX ORDER — HYDROXYCHLOROQUINE SULFATE 200 MG/1
200 TABLET, FILM COATED ORAL 2 TIMES DAILY
Qty: 60 TABLET | Refills: 6 | Status: SHIPPED | OUTPATIENT
Start: 2022-02-15 | End: 2023-06-16

## 2022-02-15 RX ORDER — SEMAGLUTIDE 1.34 MG/ML
0.5 INJECTION, SOLUTION SUBCUTANEOUS
COMMUNITY
End: 2022-09-13

## 2022-02-15 RX ORDER — FLUCONAZOLE 150 MG/1
150 TABLET ORAL DAILY
Qty: 7 TABLET | Refills: 2 | Status: SHIPPED | OUTPATIENT
Start: 2022-02-15 | End: 2022-02-22

## 2022-02-15 RX ORDER — FLUCONAZOLE 150 MG/1
TABLET ORAL
COMMUNITY
Start: 2021-12-15 | End: 2022-02-15 | Stop reason: SDUPTHER

## 2022-02-15 RX ORDER — MELOXICAM 15 MG/1
TABLET ORAL
COMMUNITY
Start: 2021-11-07 | End: 2022-05-16

## 2022-02-15 ASSESSMENT — ROUTINE ASSESSMENT OF PATIENT INDEX DATA (RAPID3)
PAIN SCORE: 5
FATIGUE SCORE: 1.1
PATIENT GLOBAL ASSESSMENT SCORE: 5
MDHAQ FUNCTION SCORE: 0.6
TOTAL RAPID3 SCORE: 4
PSYCHOLOGICAL DISTRESS SCORE: 1.1

## 2022-02-15 NOTE — TELEPHONE ENCOUNTER
----- Message from Bria Torres sent at 2/15/2022  3:23 PM CST -----  Type:  Pharmacy Calling to Clarify an RX    Pharmacy Name:  [facility]  Prescription Name:  fluconazole (DIFLUCAN) 150 MG Tab    hydrOXYchloroQUINE (PLAQUENIL) 200 mg tablet  What do they need to clarify?:  both meds can cause heart arrhythmia asking if its ok if they fill both and she takes them together   Best Call Back Number:  [phone]  Additional Information:  Please call and advise

## 2022-02-15 NOTE — PROGRESS NOTES
Subjective:       Patient ID: Dennise Watkins is a 62 y.o. female.    Chief Complaint: Disease Management    Mrs. Watkins is a 62 year old female who presents to clinic for follow up on psoriatic arthritis. She is doing poorly. She was prescribed mobic at her initial visit, but decided not to start treatment. She is taking ibuprofen PRN and baclofen PRN, which helps some with her pain. She has noticed over the last 2 week she has more stiffness throughout her body that is worse in the middle of the night. She has difficulty rolling over in bed due to pain and stiffness. She has difficulty moving from seated to standing after periods of immobility. She walked 2 miles recently and had significantly more pain for several days following this. She has pain in her neck, shoulders, knees, and feet, which is migratory. Psoriasis is minimal on the scalp.     PCP recently added ozempic.     We reviewed her recent labs: CRP remains elevated 29, esr normal. A1c 7.0%    Prior hx:   She was prescribed mobic at her last visit, but she has not started this yet. She is taking OTC ibuprofen 400 mg PRN and baclofen PRN, which is quite helpful for her symptoms. She has uncontrolled GERD despite PPI and wants to avoid additional NSAIDs if possible. She has joint pain in her bilateral shoulders associated with significant stiffness. 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 She has generalized pain throughout her body. Joint pain is also migratory--from shoulders, knee, to her feet. She wants to repeat inflammatory markers at this time to assess her current clinical status.     She was hospitalized for covid in August. She is having some consequences of covid--with worsening GERD, tachycardia, and bladder sx.    Prior tx:  1. NSAIDs    Review of Systems   Constitutional: Positive for activity change. Negative for appetite change, chills, fatigue and fever.   Eyes: Negative for visual disturbance.   Respiratory: Negative for  cough and shortness of breath.    Cardiovascular: Negative for chest pain, palpitations and leg swelling.   Gastrointestinal: Negative for abdominal pain, constipation, diarrhea, nausea and vomiting.   Musculoskeletal: Positive for arthralgias, neck pain and neck stiffness. Negative for joint swelling.   Neurological: Negative for dizziness, weakness, light-headedness and headaches.         Objective:     Vitals:    02/15/22 1429   BP: 129/83   Pulse: 91       Past Medical History:   Diagnosis Date    Abnormal blood chemistry     ADD (attention deficit disorder)     Anemia     Anxiety disorder     Arthralgia     Breast mass, left     Diabetes mellitus     Dysuria     Epistaxis     Essential hypertension, benign     Fatigue     GERD (gastroesophageal reflux disease)     Hiatal hernia     History of chicken pox     Hyperglycemia     Hypertension     Hypokalemia     Neck pain     Paresthesia     Pruritic disorder     Thyroid disease     Weight gain      Past Surgical History:   Procedure Laterality Date    ABLATION N/A 9/28/2018    Procedure: Ablation;  Surgeon: Bunny Humphreys MD;  Location: [facility] CATH LAB;  Service: Cardiology;  Laterality: N/A;  SVT ,RFA, CHELE, MAC, SK ,3 Prep    ESOPHAGOGASTRODUODENOSCOPY      HYSTERECTOMY  2000    OOPHORECTOMY  2000    RHINOPLASTY TIP      TONSILLECTOMY, ADENOIDECTOMY            Physical Exam   Eyes: Right conjunctiva is not injected. Left conjunctiva is not injected. Right eye exhibits normal extraocular motion. Left eye exhibits normal extraocular motion.   Neck: No JVD present. No thyromegaly present.   Cardiovascular: Normal rate and regular rhythm. Exam reveals no decreased pulses.   Pulmonary/Chest: She has no wheezes. She has no rhonchi. She has no rales.   Musculoskeletal:      Right shoulder: Tenderness present.      Left shoulder: Tenderness present.      Right elbow: Normal.      Left elbow: Normal.      Right wrist: Normal.      Left wrist:  Normal.      Right knee: Normal.      Left knee: Tenderness present.   Lymphadenopathy:     She has no cervical adenopathy.   Neurological: Gait normal.   Skin: No rash noted.   Psychiatric: Mood and affect normal.       Right Side Rheumatological Exam     Examination finds the elbow, wrist, knee, 1st PIP, 1st MCP, 2nd PIP, 2nd MCP, 3rd PIP, 3rd MCP, 4th PIP, 4th MCP, 5th PIP and 5th MCP normal.    The patient is tender to palpation of the shoulder    Left Side Rheumatological Exam     Examination finds the elbow, wrist, 1st PIP, 1st MCP, 2nd PIP, 2nd MCP, 3rd PIP, 3rd MCP, 4th PIP, 4th MCP, 5th PIP and 5th MCP normal.    The patient is tender to palpation of the shoulder and knee.          Labs reviewed:  Component      Latest Ref Rng & Units 2/11/2022   WBC      3.8 - 10.8 Thousand/uL 7.3   RBC      3.80 - 5.10 Million/uL 5.14 (H)   Hemoglobin      11.7 - 15.5 g/dL 13.7   Hematocrit      35.0 - 45.0 % 41.0   MCV      80.0 - 100.0 fL 79.8 (L)   MCH      27.0 - 33.0 pg 26.7 (L)   MCHC      32.0 - 36.0 g/dL 33.4   RDW      11.0 - 15.0 % 14.1   Platelets      140 - 400 Thousand/uL 342   MPV      7.5 - 12.5 fL 10.5   Neutrophils, Abs      1,500 - 7,800 cells/uL 4,862   Lymph #      850 - 3,900 cells/uL 1,978   Mono #      200 - 950 cells/uL 299   Eos #      15 - 500 cells/uL 110   Baso #      0 - 200 cells/uL 51   Neutrophils Relative      % 66.6   Lymph %      % 27.1   Mono %      % 4.1   Eosinophil %      % 1.5   Basophil %      % 0.7   Glucose      65 - 99 mg/dL 169 (H)   BUN      7 - 25 mg/dL 17   Creatinine      0.50 - 0.99 mg/dL 0.91   eGFR if non       > OR = 60 mL/min/1.73m2 68   eGFR if       > OR = 60 mL/min/1.73m2 78   BUN/CREAT RATIO      6 - 22 (calc) NOT APPLICABLE   Sodium      135 - 146 mmol/L 138   Potassium      3.5 - 5.3 mmol/L 4.0   Chloride      98 - 110 mmol/L 100   CO2      20 - 32 mmol/L 27   Calcium      8.6 - 10.4 mg/dL 10.4   PROTEIN TOTAL      6.1 - 8.1 g/dL  7.0   Albumin      3.6 - 5.1 g/dL 4.3   Globulin, Total      1.9 - 3.7 g/dL (calc) 2.7   Albumin/Globulin Ratio      1.0 - 2.5 (calc) 1.6   BILIRUBIN TOTAL      0.2 - 1.2 mg/dL 0.6   Alkaline Phosphatase      37 - 153 U/L 106   AST      10 - 35 U/L 29   ALT      6 - 29 U/L 27   Sed Rate      < OR = 30 mm/h 25   CRP      <8.0 mg/L 29.2 (H)   SARS COV 2 AB (IGG) SPIKE, SEMI QN      <1.00 index 21.56 (H)   Hemoglobin A1C External      <5.7 % of total Hgb 7.0 (H)      Assessment:       1. PSA (psoriatic arthritis)    2. Yeast infection    3. Elevated C-reactive protein (CRP)            Plan:       PSA (psoriatic arthritis)  -     hydrOXYchloroQUINE (PLAQUENIL) 200 mg tablet; Take 1 tablet (200 mg total) by mouth 2 (two) times daily.  Dispense: 60 tablet; Refill: 6  -     CBC Auto Differential; Future; Expected date: 02/15/2022  -     Comprehensive Metabolic Panel; Future; Expected date: 02/15/2022  -     C-Reactive Protein; Future; Expected date: 02/15/2022  -     Sedimentation Rate; Future; Expected date: 02/15/2022    Yeast infection  -     fluconazole (DIFLUCAN) 150 MG Tab; Take 1 tablet (150 mg total) by mouth once daily. for 7 days  Dispense: 7 tablet; Refill: 2    Elevated C-reactive protein (CRP)        Assessment:  62 year old female with  Psoriatic arthritis, elevated CRP  --history of covid infection  --controlled type 2 diabetes, HgbA1c 6.3%  --GERD  --grief    Plan:  1. Start plaquenil 200 mg daily for 7 days if no s/e then increase to BID. Pt recently had eye exam with Dr. Sandoval will send clearance letter to him to start treatment.  2. Diflucan refilled.  3. Discussed SSZ, but will avoid with GI upset/gerd and otezla may consider in the future  4. Otc to cont ibuprofen PRN or try mobic as precribed    Follow up:  3  Months w/labs prior

## 2022-02-16 NOTE — TELEPHONE ENCOUNTER
Called pharmacy and spoke to Reji, Pharm D and stated per Comfort Jimenez PA-C:Ok to take diflucan first and once completed then start plaquenil

## 2022-03-10 ENCOUNTER — PATIENT MESSAGE (OUTPATIENT)
Dept: RHEUMATOLOGY | Facility: CLINIC | Age: 62
End: 2022-03-10
Payer: OTHER GOVERNMENT

## 2022-05-16 ENCOUNTER — OFFICE VISIT (OUTPATIENT)
Dept: RHEUMATOLOGY | Facility: CLINIC | Age: 62
End: 2022-05-16
Payer: OTHER GOVERNMENT

## 2022-05-16 VITALS
WEIGHT: 226.44 LBS | HEIGHT: 65 IN | BODY MASS INDEX: 37.73 KG/M2 | DIASTOLIC BLOOD PRESSURE: 85 MMHG | HEART RATE: 108 BPM | SYSTOLIC BLOOD PRESSURE: 121 MMHG

## 2022-05-16 DIAGNOSIS — R79.82 ELEVATED C-REACTIVE PROTEIN (CRP): ICD-10-CM

## 2022-05-16 DIAGNOSIS — L80 VITILIGO: ICD-10-CM

## 2022-05-16 DIAGNOSIS — L40.50 PSA (PSORIATIC ARTHRITIS): Primary | ICD-10-CM

## 2022-05-16 DIAGNOSIS — E11.9 CONTROLLED TYPE 2 DIABETES MELLITUS WITHOUT COMPLICATION, WITHOUT LONG-TERM CURRENT USE OF INSULIN: ICD-10-CM

## 2022-05-16 DIAGNOSIS — L40.8 PSORIASIS WITH PUSTULES: ICD-10-CM

## 2022-05-16 DIAGNOSIS — F41.9 ANXIETY: ICD-10-CM

## 2022-05-16 PROCEDURE — 99215 OFFICE O/P EST HI 40 MIN: CPT | Mod: S$GLB,,, | Performed by: INTERNAL MEDICINE

## 2022-05-16 PROCEDURE — 99999 PR PBB SHADOW E&M-EST. PATIENT-LVL IV: CPT | Mod: PBBFAC,,, | Performed by: INTERNAL MEDICINE

## 2022-05-16 PROCEDURE — 99215 PR OFFICE/OUTPT VISIT, EST, LEVL V, 40-54 MIN: ICD-10-PCS | Mod: S$GLB,,, | Performed by: INTERNAL MEDICINE

## 2022-05-16 PROCEDURE — 99999 PR PBB SHADOW E&M-EST. PATIENT-LVL IV: ICD-10-PCS | Mod: PBBFAC,,, | Performed by: INTERNAL MEDICINE

## 2022-05-16 RX ORDER — ESCITALOPRAM OXALATE 5 MG/1
5 TABLET ORAL DAILY
Qty: 30 TABLET | Refills: 5 | Status: SHIPPED | OUTPATIENT
Start: 2022-05-16 | End: 2022-09-18

## 2022-05-16 RX ORDER — ALPRAZOLAM 0.25 MG/1
0.25 TABLET ORAL 4 TIMES DAILY PRN
Qty: 28 TABLET | Refills: 3 | Status: SHIPPED | OUTPATIENT
Start: 2022-05-16 | End: 2022-07-17

## 2022-05-16 RX ORDER — IBUPROFEN AND FAMOTIDINE 26.6; 8 MG/1; MG/1
1 TABLET ORAL 3 TIMES DAILY
Qty: 90 TABLET | Refills: 5 | Status: SHIPPED | OUTPATIENT
Start: 2022-05-16 | End: 2023-02-06

## 2022-05-16 NOTE — PROGRESS NOTES
Subjective:       Patient ID: Dennise Watkins is a 62 y.o. female.    Chief Complaint: Disease Management    Follow up: 62 year old female who presents to clinic for follow up on psoriatic arthritis. She is doing poorly. She started plaquenil but stopped restarted plaquenil  200 mg first  Pill today... She is taking ibuprofen PRN and baclofen PRN, which helps some with her pain. She has noticed over the last 2 week she has more stiffness throughout her body that is worse in the middle of the night.  Psoriasis is minimal on the scalp.         We reviewed her recent labs: CRP remains elevated 29, esr normal. A1c 7.0%    She was hospitalized for covid in August. She is having some consequences of covid--with worsening GERD, tachycardia, and bladder sx.her  unfortunately passed from covid- delta and the patient is 2 months until the anniversary of his death and is very sad and deeply    Prior tx:  1. NSAIDs            She reports no joint swelling. Pertinent negatives include no fatigue, fever or headaches.         Review of Systems   Constitutional: Positive for activity change. Negative for appetite change, chills, fatigue and fever.   Eyes: Negative for visual disturbance.   Respiratory: Negative for cough and shortness of breath.    Cardiovascular: Negative for chest pain, palpitations and leg swelling.   Gastrointestinal: Negative for abdominal pain, constipation, diarrhea, nausea and vomiting.   Musculoskeletal: Positive for arthralgias, gait problem, neck pain and neck stiffness. Negative for joint swelling.   Neurological: Negative for dizziness, weakness, light-headedness and headaches.         Objective:     Vitals:    05/16/22 1602   BP: 121/85   Pulse: 108       Past Medical History:   Diagnosis Date    Abnormal blood chemistry     ADD (attention deficit disorder)     Anemia     Anxiety disorder     Arthralgia     Breast mass, left     Diabetes mellitus     Dysuria     Epistaxis     Essential  hypertension, benign     Fatigue     GERD (gastroesophageal reflux disease)     Hiatal hernia     History of chicken pox     Hyperglycemia     Hypertension     Hypokalemia     Neck pain     Paresthesia     Pruritic disorder     Thyroid disease     Weight gain      Past Surgical History:   Procedure Laterality Date    ABLATION N/A 9/28/2018    Procedure: Ablation;  Surgeon: Bunny Humphreys MD;  Location: [facility] CATH LAB;  Service: Cardiology;  Laterality: N/A;  SVT ,RFA, CHELE, MAC, SK ,3 Prep    ESOPHAGOGASTRODUODENOSCOPY      HYSTERECTOMY  2000    OOPHORECTOMY  2000    RHINOPLASTY TIP      TONSILLECTOMY, ADENOIDECTOMY            Physical Exam   Eyes: Right conjunctiva is not injected. Left conjunctiva is not injected. Right eye exhibits normal extraocular motion. Left eye exhibits normal extraocular motion.   Neck: No JVD present. No thyromegaly present.   Cardiovascular: Normal rate and regular rhythm. Exam reveals no decreased pulses.   Pulmonary/Chest: She has no wheezes. She has no rhonchi. She has no rales.   Musculoskeletal:      Right shoulder: Tenderness present.      Left shoulder: Tenderness present.      Right elbow: Normal.      Left elbow: Normal.      Right wrist: Normal.      Left wrist: Normal.      Right knee: Normal.      Left knee: Tenderness present.   Lymphadenopathy:     She has no cervical adenopathy.   Neurological: Gait normal.   Skin: No rash noted.   Psychiatric: Mood and affect normal.       Right Side Rheumatological Exam     Examination finds the elbow, wrist, knee, 1st PIP, 1st MCP, 2nd PIP, 2nd MCP, 3rd PIP, 3rd MCP, 4th PIP, 4th MCP, 5th PIP and 5th MCP normal.    The patient is tender to palpation of the shoulder    Left Side Rheumatological Exam     Examination finds the elbow, wrist, 1st PIP, 1st MCP, 2nd PIP, 2nd MCP, 3rd PIP, 3rd MCP, 4th PIP, 4th MCP, 5th PIP and 5th MCP normal.    The patient is tender to palpation of the shoulder and knee.          Results for  orders placed or performed in visit on 02/11/22   Comprehensive Metabolic Panel   Result Value Ref Range    Glucose 169 (H) 65 - 99 mg/dL    BUN 17 7 - 25 mg/dL    Creatinine 0.91 0.50 - 0.99 mg/dL    eGFR if non  68 > OR = 60 mL/min/1.73m2    eGFR if African American 78 > OR = 60 mL/min/1.73m2    BUN/Creatinine Ratio NOT APPLICABLE 6 - 22 (calc)    Sodium 138 135 - 146 mmol/L    Potassium 4.0 3.5 - 5.3 mmol/L    Chloride 100 98 - 110 mmol/L    CO2 27 20 - 32 mmol/L    Calcium 10.4 8.6 - 10.4 mg/dL    Total Protein 7.0 6.1 - 8.1 g/dL    Albumin 4.3 3.6 - 5.1 g/dL    Globulin, Total 2.7 1.9 - 3.7 g/dL (calc)    Albumin/Globulin Ratio 1.6 1.0 - 2.5 (calc)    Total Bilirubin 0.6 0.2 - 1.2 mg/dL    Alkaline Phosphatase 106 37 - 153 U/L    AST 29 10 - 35 U/L    ALT 27 6 - 29 U/L   Sedimentation Rate   Result Value Ref Range    Sed Rate 25 < OR = 30 mm/h   CBC Auto Differential   Result Value Ref Range    WBC 7.3 3.8 - 10.8 Thousand/uL    RBC 5.14 (H) 3.80 - 5.10 Million/uL    Hemoglobin 13.7 11.7 - 15.5 g/dL    Hematocrit 41.0 35.0 - 45.0 %    MCV 79.8 (L) 80.0 - 100.0 fL    MCH 26.7 (L) 27.0 - 33.0 pg    MCHC 33.4 32.0 - 36.0 g/dL    RDW 14.1 11.0 - 15.0 %    Platelets 342 140 - 400 Thousand/uL    MPV 10.5 7.5 - 12.5 fL    Neutrophils, Abs 4,862 1,500 - 7,800 cells/uL    Lymph # 1,978 850 - 3,900 cells/uL    Mono # 299 200 - 950 cells/uL    Eos # 110 15 - 500 cells/uL    Baso # 51 0 - 200 cells/uL    Neutrophils Relative 66.6 %    Lymph % 27.1 %    Mono % 4.1 %    Eosinophil % 1.5 %    Basophil % 0.7 %   C-Reactive Protein   Result Value Ref Range    CRP 29.2 (H) <8.0 mg/L   COVID-19 (SARS CoV-2) IgG Antibody   Result Value Ref Range    SARS COV 2 AB (IGG) SPIKE, SEMI QN 21.56 (H) <1.00 index   Hemoglobin A1C   Result Value Ref Range    Hemoglobin A1C 7.0 (H) <5.7 % of total Hgb          Assessment:       1. PSA (psoriatic arthritis)    2. Elevated C-reactive protein (CRP)    3. Controlled type 2  diabetes mellitus without complication, without long-term current use of insulin    4. Psoriasis with pustules    5. Vitiligo    6. Anxiety            Plan:       PSA (psoriatic arthritis)  -     CBC Auto Differential; Future; Expected date: 05/16/2022  -     Comprehensive Metabolic Panel; Future; Expected date: 05/16/2022  -     Sedimentation rate; Future; Expected date: 05/16/2022  -     C-Reactive Protein; Future; Expected date: 05/16/2022  -     Hemoglobin A1C; Future; Expected date: 05/16/2022  -     COVID-19 (SARS CoV-2) IgG Antibody Quant; Future; Expected date: 05/16/2022  -     ibuprofen-famotidine (DUEXIS) 800-26.6 mg Tab; Take 1 tablet by mouth 3 (three) times daily.  Dispense: 90 tablet; Refill: 5    Elevated C-reactive protein (CRP)  -     CBC Auto Differential; Future; Expected date: 05/16/2022  -     Comprehensive Metabolic Panel; Future; Expected date: 05/16/2022  -     Sedimentation rate; Future; Expected date: 05/16/2022  -     C-Reactive Protein; Future; Expected date: 05/16/2022  -     Hemoglobin A1C; Future; Expected date: 05/16/2022  -     COVID-19 (SARS CoV-2) IgG Antibody Quant; Future; Expected date: 05/16/2022  -     ibuprofen-famotidine (DUEXIS) 800-26.6 mg Tab; Take 1 tablet by mouth 3 (three) times daily.  Dispense: 90 tablet; Refill: 5    Controlled type 2 diabetes mellitus without complication, without long-term current use of insulin  -     CBC Auto Differential; Future; Expected date: 05/16/2022  -     Comprehensive Metabolic Panel; Future; Expected date: 05/16/2022  -     Sedimentation rate; Future; Expected date: 05/16/2022  -     C-Reactive Protein; Future; Expected date: 05/16/2022  -     Hemoglobin A1C; Future; Expected date: 05/16/2022  -     COVID-19 (SARS CoV-2) IgG Antibody Quant; Future; Expected date: 05/16/2022  -     ibuprofen-famotidine (DUEXIS) 800-26.6 mg Tab; Take 1 tablet by mouth 3 (three) times daily.  Dispense: 90 tablet; Refill: 5    Psoriasis with pustules  -      CBC Auto Differential; Future; Expected date: 05/16/2022  -     Comprehensive Metabolic Panel; Future; Expected date: 05/16/2022  -     Sedimentation rate; Future; Expected date: 05/16/2022  -     C-Reactive Protein; Future; Expected date: 05/16/2022  -     Hemoglobin A1C; Future; Expected date: 05/16/2022  -     COVID-19 (SARS CoV-2) IgG Antibody Quant; Future; Expected date: 05/16/2022  -     ibuprofen-famotidine (DUEXIS) 800-26.6 mg Tab; Take 1 tablet by mouth 3 (three) times daily.  Dispense: 90 tablet; Refill: 5    Vitiligo  -     CBC Auto Differential; Future; Expected date: 05/16/2022  -     Comprehensive Metabolic Panel; Future; Expected date: 05/16/2022  -     Sedimentation rate; Future; Expected date: 05/16/2022  -     C-Reactive Protein; Future; Expected date: 05/16/2022  -     Hemoglobin A1C; Future; Expected date: 05/16/2022  -     COVID-19 (SARS CoV-2) IgG Antibody Quant; Future; Expected date: 05/16/2022  -     ibuprofen-famotidine (DUEXIS) 800-26.6 mg Tab; Take 1 tablet by mouth 3 (three) times daily.  Dispense: 90 tablet; Refill: 5    Anxiety  -     ALPRAZolam (XANAX) 0.25 MG tablet; Take 1 tablet (0.25 mg total) by mouth 4 (four) times daily as needed for Anxiety.  Dispense: 28 tablet; Refill: 3  -     EScitalopram oxalate (LEXAPRO) 5 MG Tab; Take 1 tablet (5 mg total) by mouth once daily.  Dispense: 30 tablet; Refill: 5        Assessment:  62 year old female with  Psoriatic arthritis, elevated CRP  --history of covid infection  --controlled type 2 diabetes, HgbA1c 6.3%  --GERD  --grief    Plan:  1. Start plaquenil 200 mg daily for 7 days if no s/e then increase to BID.

## 2022-05-17 ENCOUNTER — TELEPHONE (OUTPATIENT)
Dept: RHEUMATOLOGY | Facility: CLINIC | Age: 62
End: 2022-05-17
Payer: OTHER GOVERNMENT

## 2022-05-17 NOTE — TELEPHONE ENCOUNTER
Call pt to see if she has taken ibuprofen before and if she has done fine then ok to proceed with duexis  You can also confirm naproxen allergy is nosebleed

## 2022-05-17 NOTE — TELEPHONE ENCOUNTER
----- Message from Verena Booker sent at 5/17/2022 11:52 AM CDT -----   Name of Who is Calling:     What is the request in detail:  pharmacy request call back in reference to medication   ibuprofen-famotidine (DUEXIS) 800-26.6 mg Tab   / patient allergy questions  Please contact to further discuss and advise      Can the clinic reply by [facility]:     What Number to Call Back if not in [facility]:  [facility] / [facility] / [phone]

## 2022-05-18 ENCOUNTER — PATIENT MESSAGE (OUTPATIENT)
Dept: RHEUMATOLOGY | Facility: CLINIC | Age: 62
End: 2022-05-18
Payer: OTHER GOVERNMENT

## 2022-07-13 DIAGNOSIS — F41.9 ANXIETY: ICD-10-CM

## 2022-07-17 RX ORDER — ALPRAZOLAM 0.25 MG/1
0.25 TABLET ORAL 4 TIMES DAILY PRN
Qty: 28 TABLET | Refills: 3 | Status: SHIPPED | OUTPATIENT
Start: 2022-07-17 | End: 2022-10-25

## 2022-09-13 ENCOUNTER — OFFICE VISIT (OUTPATIENT)
Dept: ENDOCRINOLOGY | Facility: CLINIC | Age: 62
End: 2022-09-13
Payer: OTHER GOVERNMENT

## 2022-09-13 VITALS
HEIGHT: 65 IN | SYSTOLIC BLOOD PRESSURE: 142 MMHG | BODY MASS INDEX: 38.07 KG/M2 | HEART RATE: 87 BPM | DIASTOLIC BLOOD PRESSURE: 82 MMHG | WEIGHT: 228.5 LBS

## 2022-09-13 DIAGNOSIS — I10 HYPERTENSION, UNSPECIFIED TYPE: ICD-10-CM

## 2022-09-13 DIAGNOSIS — E03.9 HYPOTHYROIDISM, UNSPECIFIED TYPE: ICD-10-CM

## 2022-09-13 DIAGNOSIS — E11.9 TYPE 2 DIABETES MELLITUS WITHOUT COMPLICATION, WITHOUT LONG-TERM CURRENT USE OF INSULIN: Primary | ICD-10-CM

## 2022-09-13 PROCEDURE — 99203 OFFICE O/P NEW LOW 30 MIN: CPT | Mod: S$GLB,,, | Performed by: NURSE PRACTITIONER

## 2022-09-13 PROCEDURE — 99203 PR OFFICE/OUTPT VISIT, NEW, LEVL III, 30-44 MIN: ICD-10-PCS | Mod: S$GLB,,, | Performed by: NURSE PRACTITIONER

## 2022-09-13 PROCEDURE — 99999 PR PBB SHADOW E&M-EST. PATIENT-LVL V: CPT | Mod: PBBFAC,,, | Performed by: NURSE PRACTITIONER

## 2022-09-13 PROCEDURE — 99999 PR PBB SHADOW E&M-EST. PATIENT-LVL V: ICD-10-PCS | Mod: PBBFAC,,, | Performed by: NURSE PRACTITIONER

## 2022-09-13 RX ORDER — SEMAGLUTIDE 1.34 MG/ML
0.25 INJECTION, SOLUTION SUBCUTANEOUS
Qty: 1 PEN | Refills: 5 | Status: SHIPPED | OUTPATIENT
Start: 2022-09-13 | End: 2023-03-16

## 2022-09-13 NOTE — PROGRESS NOTES
"HTNSubjective:       Patient ID: Dennise Watkins is a 62 y.o. female.    Chief Complaint: Diabetes    HPI Pt is a 62 y.o. wf  with a diagnosis of Type 2 diabetes mellitus diagnosed approximately 2020 preceding borderline since 2010, , as well as chronic conditions pending review including HTN, hypothyroidism .  Other pertinent medical and social information noted includes, but not limited to: N?A .     Interim Events: Pt new to me-self referred.  Been borderline DM for years and finally crossed the border.  Was using Ozempic--worked well--but she mentioned she was having some GERD so provider would not refill.Pt states she was finally starting to tolerate when it was stopped.  No focal complaints.  Unfortunately lost her very healthy  to Covid 1 yr ago, so trying to get back on track with her new life.       Review of Systems   Constitutional:  Negative for activity change and fatigue.        Blood pressure (!) 142/82, pulse 87, height 5' 5" (1.651 m), weight 103.7 kg (228 lb 8.1 oz).   HENT:  Negative for hearing loss and trouble swallowing.    Eyes:  Negative for photophobia and visual disturbance.        Last Eye Exam:    Respiratory:  Negative for cough and shortness of breath.    Cardiovascular:  Negative for chest pain and palpitations.   Gastrointestinal:  Negative for constipation and diarrhea.   Genitourinary:  Negative for frequency and urgency.   Musculoskeletal:  Negative for arthralgias and myalgias.   Integumentary:  Negative for rash and wound.   Neurological:  Negative for weakness and numbness.   Psychiatric/Behavioral:  Negative for sleep disturbance. The patient is not nervous/anxious.        Objective:      Physical Exam  Constitutional:       Appearance: Normal appearance.   HENT:      Head: Normocephalic and atraumatic.      Nose: Nose normal.   Eyes:      Extraocular Movements: Extraocular movements intact.      Conjunctiva/sclera: Conjunctivae normal.      Pupils: Pupils are equal, " "round, and reactive to light.   Cardiovascular:      Rate and Rhythm: Normal rate and regular rhythm.   Pulmonary:      Effort: Pulmonary effort is normal.      Breath sounds: Normal breath sounds.   Musculoskeletal:         General: Normal range of motion.      Cervical back: Normal range of motion and neck supple.      Right lower leg: No edema.      Left lower leg: No edema.      Comments: Feet:   Lymphadenopathy:      Cervical: No cervical adenopathy.   Skin:     General: Skin is warm and dry.   Neurological:      General: No focal deficit present.      Mental Status: She is alert and oriented to person, place, and time.   Psychiatric:         Mood and Affect: Mood normal.         Behavior: Behavior normal.         Thought Content: Thought content normal.         Judgment: Judgment normal.         BP (!) 142/82 (BP Location: Right arm, Patient Position: Sitting, BP Method: X-Large (Manual))   Pulse 87   Ht 5' 5" (1.651 m)   Wt 103.7 kg (228 lb 8.1 oz)   BMI 38.03 kg/m²     Hemoglobin A1C   Date Value Ref Range Status   09/12/2022 6.8 (H) 0.0 - 5.6 % Final     Comment:     Reference Interval:  5.0 - 5.6 Normal   5.7 - 6.4 High Risk   > 6.5 Diabetic      Hgb A1c results are standardized based on the (NGSP) National   Glycohemoglobin Standardization Program.      Hemoglobin A1C levels are related to mean serum/plasma glucose   during the preceding 2-3 months.        02/11/2022 7.0 (H) <5.7 % of total Hgb Final     Comment:     For someone without known diabetes, a hemoglobin A1c  value of 6.5% or greater indicates that they may have   diabetes and this should be confirmed with a follow-up   test.     For someone with known diabetes, a value <7% indicates   that their diabetes is well controlled and a value   greater than or equal to 7% indicates suboptimal   control. A1c targets should be individualized based on   duration of diabetes, age, comorbid conditions, and   other considerations.     Currently, no " consensus exists regarding use of  hemoglobin A1c for diagnosis of diabetes for children.         03/18/2021 6.2 (H) <5.7 % of total Hgb Final     Comment:     For someone without known diabetes, a hemoglobin   A1c value between 5.7% and 6.4% is consistent with  prediabetes and should be confirmed with a   follow-up test.     For someone with known diabetes, a value <7%  indicates that their diabetes is well controlled. A1c  targets should be individualized based on duration of  diabetes, age, comorbid conditions, and other  considerations.     This assay result is consistent with an increased risk  of diabetes.     Currently, no consensus exists regarding use of  hemoglobin A1c for diagnosis of diabetes for children.        06/11/2020 6.6 % Final     Comment:     ordered by Derrick Harris [place]       Chemistry        Component Value Date/Time     09/12/2022 0857    K 4.1 09/12/2022 0857     09/12/2022 0857    CO2 27 09/12/2022 0857    BUN 18 09/12/2022 0857    CREATININE 0.74 09/12/2022 0857     (H) 09/12/2022 0857        Component Value Date/Time    CALCIUM 9.5 09/12/2022 0857    ALKPHOS 109 09/12/2022 0857    AST 33 09/12/2022 0857    ALT 26 09/12/2022 0857    BILITOT 0.5 09/12/2022 0857    ESTGFRAFRICA 78 02/11/2022 0956    EGFRNONAA 68 02/11/2022 0956          Lab Results   Component Value Date    CHOL 204 (H) 10/16/2020     Lab Results   Component Value Date    HDL 54 10/16/2020     Lab Results   Component Value Date    LDLCALC 123 (H) 10/16/2020     Lab Results   Component Value Date    TRIG 152 (H) 10/16/2020     Lab Results   Component Value Date    CHOLHDL 3.8 10/16/2020     Lab Results   Component Value Date    MICALBCREAT 4 10/16/2020     Lab Results   Component Value Date    TSH 1.73 03/18/2021     No results found for: [id number]   Assessment:       1. Type 2 diabetes mellitus without complication, without long-term current use of insulin  semaglutide (OZEMPIC)  0.25 mg or 0.5 mg(2 mg/1.5 mL) pen injector    Hemoglobin A1C      2. Hypothyroidism, unspecified type        3. Hypertension, unspecified type            Plan:       ORDERS 09/13/2022     3 mo with a1c prior

## 2023-01-17 DIAGNOSIS — F41.9 ANXIETY: ICD-10-CM

## 2023-01-18 ENCOUNTER — OFFICE VISIT (OUTPATIENT)
Dept: RHEUMATOLOGY | Facility: CLINIC | Age: 63
End: 2023-01-18
Payer: OTHER GOVERNMENT

## 2023-01-18 VITALS
WEIGHT: 220 LBS | HEIGHT: 65 IN | BODY MASS INDEX: 36.65 KG/M2 | DIASTOLIC BLOOD PRESSURE: 79 MMHG | SYSTOLIC BLOOD PRESSURE: 115 MMHG | HEART RATE: 114 BPM

## 2023-01-18 DIAGNOSIS — T78.40XS ALLERGY, SEQUELA: ICD-10-CM

## 2023-01-18 DIAGNOSIS — J31.0 CHRONIC RHINITIS: ICD-10-CM

## 2023-01-18 DIAGNOSIS — L40.8 PSORIASIS WITH PUSTULES: ICD-10-CM

## 2023-01-18 DIAGNOSIS — L71.8 OCULAR ROSACEA: ICD-10-CM

## 2023-01-18 DIAGNOSIS — L40.50 PSA (PSORIATIC ARTHRITIS): ICD-10-CM

## 2023-01-18 DIAGNOSIS — R79.82 ELEVATED C-REACTIVE PROTEIN (CRP): Primary | ICD-10-CM

## 2023-01-18 DIAGNOSIS — R11.2 NAUSEA AND VOMITING, UNSPECIFIED VOMITING TYPE: ICD-10-CM

## 2023-01-18 DIAGNOSIS — F98.8 ATTENTION DEFICIT DISORDER (ADD) IN ADULT: ICD-10-CM

## 2023-01-18 PROCEDURE — 99215 OFFICE O/P EST HI 40 MIN: CPT | Mod: S$GLB,,, | Performed by: INTERNAL MEDICINE

## 2023-01-18 PROCEDURE — 99999 PR PBB SHADOW E&M-EST. PATIENT-LVL IV: CPT | Mod: PBBFAC,,, | Performed by: INTERNAL MEDICINE

## 2023-01-18 PROCEDURE — 99999 PR PBB SHADOW E&M-EST. PATIENT-LVL IV: ICD-10-PCS | Mod: PBBFAC,,, | Performed by: INTERNAL MEDICINE

## 2023-01-18 PROCEDURE — 99215 PR OFFICE/OUTPT VISIT, EST, LEVL V, 40-54 MIN: ICD-10-PCS | Mod: S$GLB,,, | Performed by: INTERNAL MEDICINE

## 2023-01-18 RX ORDER — CYCLOBENZAPRINE HCL 5 MG
5 TABLET ORAL 3 TIMES DAILY PRN
COMMUNITY
Start: 2023-01-17 | End: 2023-07-20

## 2023-01-18 RX ORDER — ERYTHROMYCIN 5 MG/G
OINTMENT OPHTHALMIC 3 TIMES DAILY
Qty: 3 G | Refills: 1 | Status: SHIPPED | OUTPATIENT
Start: 2023-01-18 | End: 2023-02-02

## 2023-01-18 RX ORDER — MONTELUKAST SODIUM 10 MG/1
10 TABLET ORAL NIGHTLY
Qty: 90 TABLET | Refills: 1 | Status: SHIPPED | OUTPATIENT
Start: 2023-01-18 | End: 2023-06-16

## 2023-01-18 RX ORDER — GENTAMICIN SULFATE 3 MG/ML
1 SOLUTION/ DROPS OPHTHALMIC EVERY 4 HOURS
Qty: 10 ML | Refills: 0 | Status: SHIPPED | OUTPATIENT
Start: 2023-01-18 | End: 2023-02-06

## 2023-01-18 RX ORDER — DOXYCYCLINE HYCLATE 100 MG
100 TABLET ORAL 2 TIMES DAILY
Qty: 30 TABLET | Refills: 0 | Status: SHIPPED | OUTPATIENT
Start: 2023-01-18 | End: 2023-02-02

## 2023-01-18 RX ORDER — CELECOXIB 200 MG/1
CAPSULE ORAL 2 TIMES DAILY PRN
COMMUNITY
Start: 2023-01-17 | End: 2023-07-20

## 2023-01-18 RX ORDER — ATOMOXETINE 40 MG/1
40 CAPSULE ORAL DAILY
Qty: 30 CAPSULE | Refills: 11 | Status: SHIPPED | OUTPATIENT
Start: 2023-01-18 | End: 2023-03-16

## 2023-01-18 RX ORDER — ONDANSETRON 4 MG/1
4 TABLET, ORALLY DISINTEGRATING ORAL EVERY 6 HOURS PRN
Qty: 45 TABLET | Refills: 1 | Status: SHIPPED | OUTPATIENT
Start: 2023-01-18 | End: 2023-02-06

## 2023-01-18 RX ORDER — OLOPATADINE HYDROCHLORIDE 1 MG/ML
1 SOLUTION/ DROPS OPHTHALMIC 2 TIMES DAILY
Qty: 5 ML | Refills: 3 | Status: SHIPPED | OUTPATIENT
Start: 2023-01-18 | End: 2024-01-18

## 2023-01-18 RX ORDER — FLAVOXATE HYDROCHLORIDE 100 MG/1
100 TABLET ORAL 4 TIMES DAILY PRN
COMMUNITY
Start: 2023-01-12 | End: 2023-06-16

## 2023-01-18 RX ORDER — FLUCONAZOLE 150 MG/1
150 TABLET ORAL
COMMUNITY
Start: 2023-01-12

## 2023-01-18 ASSESSMENT — ROUTINE ASSESSMENT OF PATIENT INDEX DATA (RAPID3)
TOTAL RAPID3 SCORE: 2
FATIGUE SCORE: 1.1
PSYCHOLOGICAL DISTRESS SCORE: 3.3
PAIN SCORE: 2
PATIENT GLOBAL ASSESSMENT SCORE: 2
MDHAQ FUNCTION SCORE: 0.6

## 2023-01-18 NOTE — PROGRESS NOTES
Subjective:       Patient ID: Dennise Watkins is a 63 y.o. female.    Chief Complaint: Disease Management    Follow up: 62 year old female who presents to clinic for follow up on psoriatic arthritis. She is doing poorly. She started plaquenil but stopped restarted plaquenil  200 mg first She is taking ibuprofen PRN and baclofen PRN, which helps some with her pain. She has noticed over the last 2 week she has more stiffness throughout her body that is worse in the middle of the night.  Psoriasis is minimal on the scalp.         We reviewed her recent labs: CRP remains elevated 29, esr normal. A1c 7.0%    She was hospitalized for covid in August. She is having some consequences of covid--with worsening GERD, tachycardia, and bladder sx.her  unfortunately passed from covid- delta and the patient is 2 months until the anniversary of his death and is very sad and deeply    Prior tx:  1. NSAIDs    Review of Systems   Constitutional:  Positive for activity change and fatigue. Negative for appetite change and chills.   Eyes:  Negative for visual disturbance.   Respiratory:  Negative for cough and shortness of breath.    Cardiovascular:  Negative for chest pain, palpitations and leg swelling.   Gastrointestinal:  Negative for abdominal pain, constipation, diarrhea, nausea and vomiting.   Musculoskeletal:  Positive for arthralgias, gait problem, neck pain and neck stiffness.   Neurological:  Negative for dizziness, weakness and light-headedness.       Objective:     Vitals:    01/18/23 1041   BP: 115/79   Pulse: (!) 114       Past Medical History:   Diagnosis Date    Abnormal blood chemistry     ADD (attention deficit disorder)     Anemia     Anxiety disorder     Arthralgia     Breast mass, left     Diabetes mellitus     Dysuria     Epistaxis     Essential hypertension, benign     Fatigue     GERD (gastroesophageal reflux disease)     Hiatal hernia     History of chicken pox     Hyperglycemia     Hypertension      Hypokalemia     Neck pain     Paresthesia     Pruritic disorder     Thyroid disease     Weight gain      Past Surgical History:   Procedure Laterality Date    ABLATION N/A 9/28/2018    Procedure: Ablation;  Surgeon: Bunny Humphreys MD;  Location: [facility] CATH LAB;  Service: Cardiology;  Laterality: N/A;  SVT ,RFA, CHELE, MAC, SK ,3 Prep    ESOPHAGOGASTRODUODENOSCOPY      HYSTERECTOMY  2000    OOPHORECTOMY  2000    RHINOPLASTY TIP      TONSILLECTOMY, ADENOIDECTOMY            Physical Exam   Eyes: Right conjunctiva is not injected. Left conjunctiva is not injected. Right eye exhibits normal extraocular motion. Left eye exhibits normal extraocular motion.   Neck: No JVD present. No thyromegaly present.   Cardiovascular: Normal rate and regular rhythm. Exam reveals no decreased pulses.   Pulmonary/Chest: She has no wheezes. She has no rhonchi. She has no rales.   Musculoskeletal:         General: Swelling and tenderness present.      Right shoulder: Tenderness present.      Left shoulder: Tenderness present.      Right elbow: Normal.      Left elbow: Normal.      Right wrist: Normal.      Left wrist: Normal.      Right knee: Normal.      Left knee: Tenderness present.   Lymphadenopathy:     She has no cervical adenopathy.   Neurological: Gait normal.   Skin: No rash noted.   Psychiatric: Mood and affect normal.       Right Side Rheumatological Exam     Examination finds the elbow, wrist, knee, 1st PIP, 1st MCP, 2nd PIP, 2nd MCP, 3rd PIP, 3rd MCP, 4th PIP, 4th MCP, 5th PIP and 5th MCP normal.    The patient is tender to palpation of the shoulder    Left Side Rheumatological Exam     Examination finds the elbow, wrist, 1st PIP, 1st MCP, 2nd PIP, 2nd MCP, 3rd PIP, 3rd MCP, 4th PIP, 4th MCP, 5th PIP and 5th MCP normal.    The patient is tender to palpation of the shoulder and knee.        Results for orders placed or performed in visit on 01/13/23   Hemoglobin A1C   Result Value Ref Range    Hemoglobin A1C 6.5 (H) 0.0 - 5.6 %     Estimated Avg Glucose 140 (H) 68 - 131 mg/dL          Assessment:       1. Elevated C-reactive protein (CRP)    2. Allergy, sequela    3. Ocular rosacea    4. Nausea and vomiting, unspecified vomiting type    5. PSA (psoriatic arthritis)    6. Psoriasis with pustules    7. Attention deficit disorder (ADD) in adult    8. Chronic rhinitis              Plan:       Elevated C-reactive protein (CRP)  -     gentamicin (GARAMYCIN) 0.3 % ophthalmic solution; Place 1 drop into both eyes every 4 (four) hours.  Dispense: 10 mL; Refill: 0  -     doxycycline (VIBRA-TABS) 100 MG tablet; Take 1 tablet (100 mg total) by mouth 2 (two) times daily. for 15 days  Dispense: 30 tablet; Refill: 0  -     JODI Screen w/Reflex; Future; Expected date: 01/18/2023  -     Sedimentation rate; Future; Expected date: 01/18/2023  -     C-Reactive Protein; Future; Expected date: 01/18/2023  -     CBC Auto Differential; Future; Expected date: 01/18/2023  -     Comprehensive Metabolic Panel; Future; Expected date: 01/18/2023    Allergy, sequela  -     montelukast (SINGULAIR) 10 mg tablet; Take 1 tablet (10 mg total) by mouth every evening.  Dispense: 90 tablet; Refill: 1  -     gentamicin (GARAMYCIN) 0.3 % ophthalmic solution; Place 1 drop into both eyes every 4 (four) hours.  Dispense: 10 mL; Refill: 0  -     doxycycline (VIBRA-TABS) 100 MG tablet; Take 1 tablet (100 mg total) by mouth 2 (two) times daily. for 15 days  Dispense: 30 tablet; Refill: 0  -     JODI Screen w/Reflex; Future; Expected date: 01/18/2023  -     Sedimentation rate; Future; Expected date: 01/18/2023  -     C-Reactive Protein; Future; Expected date: 01/18/2023  -     CBC Auto Differential; Future; Expected date: 01/18/2023  -     Comprehensive Metabolic Panel; Future; Expected date: 01/18/2023    Ocular rosacea  -     erythromycin (ROMYCIN) ophthalmic ointment; Place into the right eye 3 (three) times daily. for 15 days  Dispense: 3 g; Refill: 1  -     JODI Screen w/Reflex; Future;  Expected date: 01/18/2023  -     Sedimentation rate; Future; Expected date: 01/18/2023  -     C-Reactive Protein; Future; Expected date: 01/18/2023  -     CBC Auto Differential; Future; Expected date: 01/18/2023  -     Comprehensive Metabolic Panel; Future; Expected date: 01/18/2023    Nausea and vomiting, unspecified vomiting type  -     ondansetron (ZOFRAN-ODT) 4 MG TbDL; Take 1 tablet (4 mg total) by mouth every 6 (six) hours as needed (n/v).  Dispense: 45 tablet; Refill: 1  -     JODI Screen w/Reflex; Future; Expected date: 01/18/2023  -     Sedimentation rate; Future; Expected date: 01/18/2023  -     C-Reactive Protein; Future; Expected date: 01/18/2023  -     CBC Auto Differential; Future; Expected date: 01/18/2023  -     Comprehensive Metabolic Panel; Future; Expected date: 01/18/2023    PSA (psoriatic arthritis)  -     JODI Screen w/Reflex; Future; Expected date: 01/18/2023  -     Sedimentation rate; Future; Expected date: 01/18/2023  -     C-Reactive Protein; Future; Expected date: 01/18/2023  -     CBC Auto Differential; Future; Expected date: 01/18/2023  -     Comprehensive Metabolic Panel; Future; Expected date: 01/18/2023    Psoriasis with pustules  -     JODI Screen w/Reflex; Future; Expected date: 01/18/2023  -     Sedimentation rate; Future; Expected date: 01/18/2023  -     C-Reactive Protein; Future; Expected date: 01/18/2023  -     CBC Auto Differential; Future; Expected date: 01/18/2023  -     Comprehensive Metabolic Panel; Future; Expected date: 01/18/2023    Attention deficit disorder (ADD) in adult  -     atomoxetine (STRATTERA) 40 MG capsule; Take 1 capsule (40 mg total) by mouth once daily.  Dispense: 30 capsule; Refill: 11    Chronic rhinitis  -     olopatadine (PATANOL) 0.1 % ophthalmic solution; Place 1 drop into both eyes 2 (two) times daily.  Dispense: 5 mL; Refill: 3          Assessment:  63 year old female with  Psoriatic arthritis, elevated CRP  --history of covid infection  --controlled  type 2 diabetes, HgbA1c 6.3%  --GERD  --grief    Plan:  1. Start plaquenil 200 mg daily qhs  2.add straterra   3 labs ordered    More than 50% of the  56 minute encounter was spent face to face counseling the patient regarding current status and future plan of care as well as side of the medications. All questions were answered to patient's satisfaction

## 2023-01-21 RX ORDER — ALPRAZOLAM 0.25 MG/1
0.25 TABLET ORAL 4 TIMES DAILY PRN
Qty: 28 TABLET | Refills: 3 | Status: SHIPPED | OUTPATIENT
Start: 2023-01-21 | End: 2023-03-08

## 2023-02-06 PROBLEM — Z12.11 COLON CANCER SCREENING: Status: ACTIVE | Noted: 2023-02-06

## 2023-02-06 PROBLEM — F32.0 CURRENT MILD EPISODE OF MAJOR DEPRESSIVE DISORDER WITHOUT PRIOR EPISODE: Status: ACTIVE | Noted: 2023-02-06

## 2023-02-06 PROBLEM — L40.50 PSORIATIC ARTHRITIS: Status: ACTIVE | Noted: 2023-02-06

## 2023-03-06 DIAGNOSIS — F41.9 ANXIETY: ICD-10-CM

## 2023-03-08 RX ORDER — ALPRAZOLAM 0.25 MG/1
TABLET ORAL
Qty: 28 TABLET | Refills: 5 | Status: SHIPPED | OUTPATIENT
Start: 2023-03-08 | End: 2023-10-24

## 2023-03-16 ENCOUNTER — OFFICE VISIT (OUTPATIENT)
Dept: ENDOCRINOLOGY | Facility: CLINIC | Age: 63
End: 2023-03-16
Payer: OTHER GOVERNMENT

## 2023-03-16 VITALS
WEIGHT: 230.25 LBS | SYSTOLIC BLOOD PRESSURE: 124 MMHG | OXYGEN SATURATION: 100 % | HEART RATE: 85 BPM | HEIGHT: 65 IN | DIASTOLIC BLOOD PRESSURE: 76 MMHG | BODY MASS INDEX: 38.36 KG/M2

## 2023-03-16 DIAGNOSIS — E11.9 CONTROLLED TYPE 2 DIABETES MELLITUS WITHOUT COMPLICATION, WITHOUT LONG-TERM CURRENT USE OF INSULIN: Primary | ICD-10-CM

## 2023-03-16 DIAGNOSIS — I10 HYPERTENSION, WELL CONTROLLED: ICD-10-CM

## 2023-03-16 DIAGNOSIS — E11.9 TYPE 2 DIABETES MELLITUS WITHOUT COMPLICATION, WITHOUT LONG-TERM CURRENT USE OF INSULIN: ICD-10-CM

## 2023-03-16 PROCEDURE — 99214 OFFICE O/P EST MOD 30 MIN: CPT | Mod: S$GLB,,, | Performed by: NURSE PRACTITIONER

## 2023-03-16 PROCEDURE — 99214 PR OFFICE/OUTPT VISIT, EST, LEVL IV, 30-39 MIN: ICD-10-PCS | Mod: S$GLB,,, | Performed by: NURSE PRACTITIONER

## 2023-03-16 PROCEDURE — 99999 PR PBB SHADOW E&M-EST. PATIENT-LVL V: ICD-10-PCS | Mod: PBBFAC,,, | Performed by: NURSE PRACTITIONER

## 2023-03-16 PROCEDURE — 99999 PR PBB SHADOW E&M-EST. PATIENT-LVL V: CPT | Mod: PBBFAC,,, | Performed by: NURSE PRACTITIONER

## 2023-03-16 RX ORDER — BLOOD-GLUCOSE SENSOR
1 EACH MISCELLANEOUS
Qty: 2 EACH | Refills: 12 | Status: SHIPPED | OUTPATIENT
Start: 2023-03-16 | End: 2024-03-11 | Stop reason: SDUPTHER

## 2023-03-16 RX ORDER — SEMAGLUTIDE 1.34 MG/ML
0.5 INJECTION, SOLUTION SUBCUTANEOUS
Qty: 1 EACH | Refills: 12 | Status: SHIPPED | OUTPATIENT
Start: 2023-03-16 | End: 2024-03-11

## 2023-03-16 NOTE — PROGRESS NOTES
HTNSubjective:       Patient ID: Dennise Watkins is a 63 y.o. female.    Chief Complaint: Diabetes     HPI Pt is a 63 y.o. wf  with a diagnosis of Type 2 diabetes mellitus diagnosed approximately 2020 preceding borderline since 2010, , as well as chronic conditions pending review including HTN, hypothyroidism .  Other pertinent medical and social information noted includes, but not limited to: N?A .     Interim Events: march 16, 2023:  Pt restarted ozempic--currently tolerating 0.25 mg.  A1C excellent.  No wt loss though.  No focal complaints. Still working on adjusting to life without      Current DM meds:   ozempic        Failed DM meds:  none   Statin: atorvastatin 10 mg        Not tolerated statin : na   ACE/ARB:olmesartan 40 mg        Not tolerated ACE/ARB: na   Known Diabetic complications: na     Sept 13, 2022: Pt new to me-self referred.  Been borderline DM for years and finally crossed the border.  Was using Ozempic--worked well--but she mentioned she was having some GERD so provider would not refill.Pt states she was finally starting to tolerate when it was stopped.  No focal complaints.  Unfortunately lost her very healthy  to Covid 1 yr ago, so trying to get back on track with her new life.       Review of Systems   Constitutional:  Negative for activity change and fatigue.   HENT:  Negative for hearing loss and trouble swallowing.    Eyes:  Negative for photophobia and visual disturbance.        Last Eye Exam:    Respiratory:  Negative for cough and shortness of breath.    Cardiovascular:  Negative for chest pain and palpitations.   Gastrointestinal:  Negative for constipation and diarrhea.   Genitourinary:  Negative for frequency and urgency.   Musculoskeletal:  Negative for arthralgias and myalgias.   Integumentary:  Negative for rash and wound.   Neurological:  Negative for weakness and numbness.   Psychiatric/Behavioral:  Negative for sleep disturbance. The patient is not  "nervous/anxious.        Objective:      Physical Exam  Constitutional:       Appearance: Normal appearance.   HENT:      Head: Normocephalic and atraumatic.      Nose: Nose normal.   Eyes:      Extraocular Movements: Extraocular movements intact.      Conjunctiva/sclera: Conjunctivae normal.      Pupils: Pupils are equal, round, and reactive to light.   Cardiovascular:      Rate and Rhythm: Normal rate and regular rhythm.   Pulmonary:      Effort: Pulmonary effort is normal.      Breath sounds: Normal breath sounds.   Musculoskeletal:         General: Normal range of motion.      Cervical back: Normal range of motion and neck supple.      Right lower leg: No edema.      Left lower leg: No edema.      Comments: Feet:   Lymphadenopathy:      Cervical: No cervical adenopathy.   Skin:     General: Skin is warm and dry.   Neurological:      General: No focal deficit present.      Mental Status: She is alert and oriented to person, place, and time.   Psychiatric:         Mood and Affect: Mood normal.         Behavior: Behavior normal.         Thought Content: Thought content normal.         Judgment: Judgment normal.         /76 (BP Location: Right arm, Patient Position: Sitting, BP Method: Medium (Manual))   Pulse 85   Ht 5' 5" (1.651 m)   Wt 104.5 kg (230 lb 4.3 oz)   SpO2 100%   BMI 38.32 kg/m²     Hemoglobin A1C   Date Value Ref Range Status   01/13/2023 6.5 (H) 0.0 - 5.6 % Final     Comment:     Reference Interval:  5.0 - 5.6 Normal   5.7 - 6.4 High Risk   > 6.5 Diabetic      Hgb A1c results are standardized based on the (NGSP) National   Glycohemoglobin Standardization Program.      Hemoglobin A1C levels are related to mean serum/plasma glucose   during the preceding 2-3 months.        09/12/2022 6.8 (H) 0.0 - 5.6 % Final     Comment:     Reference Interval:  5.0 - 5.6 Normal   5.7 - 6.4 High Risk   > 6.5 Diabetic      Hgb A1c results are standardized based on the (NGSP) National   Glycohemoglobin " Standardization Program.      Hemoglobin A1C levels are related to mean serum/plasma glucose   during the preceding 2-3 months.        02/11/2022 7.0 (H) <5.7 % of total Hgb Final     Comment:     For someone without known diabetes, a hemoglobin A1c  value of 6.5% or greater indicates that they may have   diabetes and this should be confirmed with a follow-up   test.     For someone with known diabetes, a value <7% indicates   that their diabetes is well controlled and a value   greater than or equal to 7% indicates suboptimal   control. A1c targets should be individualized based on   duration of diabetes, age, comorbid conditions, and   other considerations.     Currently, no consensus exists regarding use of  hemoglobin A1c for diagnosis of diabetes for children.         06/11/2020 6.6 % Final     Comment:     ordered by Derrick Harris [place]       Chemistry        Component Value Date/Time     09/12/2022 0857    K 4.1 09/12/2022 0857     09/12/2022 0857    CO2 27 09/12/2022 0857    BUN 18 09/12/2022 0857    CREATININE 0.74 09/12/2022 0857     (H) 09/12/2022 0857        Component Value Date/Time    CALCIUM 9.5 09/12/2022 0857    ALKPHOS 109 09/12/2022 0857    AST 33 09/12/2022 0857    ALT 26 09/12/2022 0857    BILITOT 0.5 09/12/2022 0857    ESTGFRAFRICA 78 02/11/2022 0956    EGFRNONAA 68 02/11/2022 0956          Lab Results   Component Value Date    CHOL 204 (H) 10/16/2020     Lab Results   Component Value Date    HDL 54 10/16/2020     Lab Results   Component Value Date    LDLCALC 123 (H) 10/16/2020     Lab Results   Component Value Date    TRIG 152 (H) 10/16/2020     Lab Results   Component Value Date    CHOLHDL 3.8 10/16/2020     Lab Results   Component Value Date    MICALBCREAT 4 10/16/2020     Lab Results   Component Value Date    TSH 1.73 03/18/2021     No results found for: [id number]   Assessment:       1. Controlled type 2 diabetes mellitus without complication,  without long-term current use of insulin        2. Type 2 diabetes mellitus without complication, without long-term current use of insulin  semaglutide (OZEMPIC) 0.25 mg or 0.5 mg(2 mg/1.5 mL) pen injector    blood-glucose sensor (FREESTYLE AVIS 3 SENSOR) Monie      3. Hypertension, well controlled                Plan:       Increase ozecmpic to 0.5 mg   Cont atorvastatin  Cont olmesartan     Wants sensor--advised insurance may not cover--  ORDERS 03/16/2023       6 months with a1c prior

## 2023-06-07 DIAGNOSIS — L40.8 PSORIASIS WITH PUSTULES: ICD-10-CM

## 2023-06-07 DIAGNOSIS — L80 VITILIGO: ICD-10-CM

## 2023-06-07 DIAGNOSIS — E11.51 DM (DIABETES MELLITUS) TYPE II, CONTROLLED, WITH PERIPHERAL VASCULAR DISORDER: ICD-10-CM
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
## 2023-06-07 DIAGNOSIS — L40.50 PSA (PSORIATIC ARTHRITIS): ICD-10-CM

## 2023-06-07 DIAGNOSIS — S00.96XA TICK BITE OF HEAD, INITIAL ENCOUNTER: ICD-10-CM

## 2023-06-07 DIAGNOSIS — M54.40 LOW BACK PAIN WITH SCIATICA, SCIATICA LATERALITY UNSPECIFIED, UNSPECIFIED BACK PAIN LATERALITY, UNSPECIFIED CHRONICITY: ICD-10-CM

## 2023-06-07 RX ORDER — BACLOFEN 20 MG/1
TABLET ORAL
Qty: 270 TABLET | Refills: 3 | Status: SHIPPED | OUTPATIENT
Start: 2023-06-07

## 2023-06-23 ENCOUNTER — TELEPHONE (OUTPATIENT)
Dept: NEUROLOGY | Facility: CLINIC | Age: 63
End: 2023-06-23
Payer: OTHER GOVERNMENT

## 2023-07-20 ENCOUNTER — OFFICE VISIT (OUTPATIENT)
Dept: NEUROLOGY | Facility: CLINIC | Age: 63
End: 2023-07-20
Payer: OTHER GOVERNMENT

## 2023-07-20 VITALS
HEART RATE: 73 BPM | RESPIRATION RATE: 17 BRPM | HEIGHT: 65 IN | DIASTOLIC BLOOD PRESSURE: 84 MMHG | WEIGHT: 223.44 LBS | BODY MASS INDEX: 37.23 KG/M2 | SYSTOLIC BLOOD PRESSURE: 129 MMHG

## 2023-07-20 DIAGNOSIS — G43.019 INTRACTABLE MIGRAINE WITHOUT AURA AND WITHOUT STATUS MIGRAINOSUS: Primary | ICD-10-CM

## 2023-07-20 DIAGNOSIS — F45.8 BRUXISM: ICD-10-CM

## 2023-07-20 DIAGNOSIS — E11.9 CONTROLLED TYPE 2 DIABETES MELLITUS WITHOUT COMPLICATION, WITHOUT LONG-TERM CURRENT USE OF INSULIN: ICD-10-CM

## 2023-07-20 DIAGNOSIS — R51.9 NEW ONSET OF HEADACHES AFTER AGE 50: ICD-10-CM

## 2023-07-20 DIAGNOSIS — I47.10 SVT (SUPRAVENTRICULAR TACHYCARDIA): ICD-10-CM

## 2023-07-20 DIAGNOSIS — M79.18 CERVICAL MYOFASCIAL PAIN SYNDROME: ICD-10-CM

## 2023-07-20 PROCEDURE — 99205 PR OFFICE/OUTPT VISIT, NEW, LEVL V, 60-74 MIN: ICD-10-PCS | Mod: S$GLB,,, | Performed by: NURSE PRACTITIONER

## 2023-07-20 PROCEDURE — 99999 PR PBB SHADOW E&M-EST. PATIENT-LVL V: CPT | Mod: PBBFAC,,, | Performed by: NURSE PRACTITIONER

## 2023-07-20 PROCEDURE — 99999 PR PBB SHADOW E&M-EST. PATIENT-LVL V: ICD-10-PCS | Mod: PBBFAC,,, | Performed by: NURSE PRACTITIONER

## 2023-07-20 PROCEDURE — 99205 OFFICE O/P NEW HI 60 MIN: CPT | Mod: S$GLB,,, | Performed by: NURSE PRACTITIONER

## 2023-07-20 RX ORDER — RIMEGEPANT SULFATE 75 MG/75MG
75 TABLET, ORALLY DISINTEGRATING ORAL DAILY PRN
Qty: 16 TABLET | Refills: 11 | Status: SHIPPED | OUTPATIENT
Start: 2023-07-20

## 2023-07-20 RX ORDER — TIZANIDINE 4 MG/1
TABLET ORAL
Qty: 90 TABLET | Refills: 3 | Status: SHIPPED | OUTPATIENT
Start: 2023-07-20

## 2023-07-20 NOTE — PROGRESS NOTES
Date of service: 7/20/2023  Referring provider: Comfort Eddy    Subjective:      Chief complaint: Headache       Patient ID: Dennise Watkins is a 63 y.o. female with anxiety, chronic urticaria, DMII, HTN, hypothyroidism, psoriatic arthritis, SVT s/p ablation who presents for new patient evaluation of headache     History of Present Illness    ORIGINAL HEADACHE HISTORY - 7/20/23  Age at onset and course over time: May 2023 began to have increased stress and increased frequency of migraines. Began in May as a daily headache. She has had headache free days since May.  No new medications and no illness, no vaccines.  No vision changes. No jaw claudication.   She has a remote history of rare migraines.    Family history of migraines - none  Last eye exam - 2 years ago    Location: forehead, top of head   Quality:  [] pressure [] tight [x] throbbing [] sharp [] stabbing   Severity: current 6 with range 1-8  Duration: hours  Frequency: daily  Headaches awaken at night?:  yes  Worst time of day: mid-day, evening   Associated with: [] photophobia []  phonophobia [] osmophobia [] blurred vision  [] double vision [] loss of appetite [x] nausea [] vomiting [] dizziness [] vertigo  [] tinnitus [] irritability [] sinus pressure [] problems with concentration   [x] neck tightness   Alleviated by:  [] sleep [] darkness [] massage [] heat [] ice [x] medication  Exacerbated by:  [x] fatigue [] light [] noise [] smells [] coughing [] sneezing  [] bending over [] ovulation [] menses [] alcohol [] change in weather [x]  stress  Ipsilateral autonomic: [] nasal congestion [] lacrimation [] ptosis [] injection [] edema [] foreign body sensation [] ear fullness   ICP:  [] transient visual obscurations  [x] tinnitus steady  [] positional headache  [x] non-positional   Sleep habits: trouble falling asleep, trouble staying asleep, unrefreshing sleep  Caffeine intake: 1 cup coffee   Gyn status (if female): total hysterectomy  HIT 6 -   62    Current acute treatment:  (Xanax)  Atarax  Excedrin migraine - near daily for 6 weeks    Tramadol     Current prevention:  Metoprolol  Olmesartan-HCTZ  Amlodipine - PRN    Previously tried/failed acute treatment:  Aleve - allergy  Celebrex  Flexeril    Previously tried/failed preventative treatment:  Celexa   Lexapro    Review of patient's allergies indicates:   Allergen Reactions    Sulfa (sulfonamide antibiotics) Hives    Aleve [naproxen sodium] Other (See Comments)     Nose bleed    Amoxicillin-pot clavulanate Diarrhea     Other reaction(s): diarrhea     Current Outpatient Medications   Medication Sig Dispense Refill    ALPRAZolam (XANAX) 0.25 MG tablet TAKE 1 TABLET BY MOUTH FOUR TIMES A DAY AS NEEDED FOR ANXIETY 28 tablet 5    amlodipine (NORVASC) 5 MG tablet Take 5 mg by mouth daily as needed.      atorvastatin (LIPITOR) 10 MG tablet Take 1 tablet (10 mg total) by mouth once daily. 90 tablet 3    azelastine (ASTELIN) 137 mcg (0.1 %) nasal spray 1 spray by Nasal route 2 (two) times daily as needed for Rhinitis.      baclofen (LIORESAL) 20 MG tablet TAKE 1 TABLET BY MOUTH THREE TIMES A DAY (Patient taking differently: Take 20 mg by mouth 3 (three) times daily as needed.) 270 tablet 3    blood-glucose sensor (FREESTYLE AVIS 3 SENSOR) Monie 1 Device by Misc.(Non-Drug; Combo Route) route every 14 (fourteen) days. 2 each 12    clotrimazole (LOTRIMIN) 1 % cream APPLY TO AFFECTED AREA TWICE A DAY 30 g 3    EScitalopram oxalate (LEXAPRO) 5 MG Tab TAKE 1 TABLET BY MOUTH EVERY DAY 90 tablet 1    estradioL (ESTRACE) 1 MG tablet Take 1 mg by mouth once daily.      fluconazole (DIFLUCAN) 150 MG Tab Take 150 mg by mouth as needed.      hydrOXYzine HCl (ATARAX) 25 MG tablet TAKE 1 TABLET (25 MG TOTAL) BY MOUTH EVERY EVENING. 30 tablet 1    ketoconazole (NIZORAL) 2 % cream Apply 1 application topically 2 (two) times daily. 30 g 1    ketoconazole (NIZORAL) 2 % shampoo Apply topically twice a week. 120 mL 3     levocetirizine (XYZAL) 5 MG tablet Take 1 tablet (5 mg total) by mouth 2 (two) times daily. 60 tablet 5    metoprolol succinate (TOPROL-XL) 25 MG 24 hr tablet TAKE 1 TABLET DAILY 90 tablet 1    mometasone 0.1% (ELOCON) 0.1 % cream Apply to affected area daily 45 g 1    NATURE-THROID 65 mg Tab Take 1 tablet by mouth every morning. 90 tablet 3    olmesartan-hydrochlorothiazide (BENICAR HCT) 40-12.5 mg Tab Take 1 tablet by mouth once daily. 90 tablet 1    olopatadine (PATANOL) 0.1 % ophthalmic solution Place 1 drop into both eyes 2 (two) times daily. 5 mL 3    omeprazole (PRILOSEC) 20 MG capsule Take 1 capsule (20 mg total) by mouth 2 (two) times daily. 180 capsule 3    semaglutide (OZEMPIC) 0.25 mg or 0.5 mg(2 mg/1.5 mL) pen injector Inject 0.5 mg into the skin every 7 days. 1 each 12    famotidine (PEPCID) 40 MG tablet Take 1 tablet (40 mg total) by mouth nightly as needed for Heartburn. 30 tablet 5    rimegepant (NURTEC) 75 mg odt Take 1 tablet (75 mg total) by mouth daily as needed for Migraine. Place ODT tablet on the tongue; alternatively the ODT tablet may be placed under the tongue 16 tablet 11    tiZANidine (ZANAFLEX) 4 MG tablet Half or full tablet by mouth at night as needed for muscle spasm 90 tablet 3     No current facility-administered medications for this visit.       Past Medical History  Past Medical History:   Diagnosis Date    Abnormal blood chemistry     ADD (attention deficit disorder)     Anemia     Anxiety disorder     Arthralgia     Breast mass, left     Diabetes mellitus     Dysuria     Epistaxis     Essential hypertension, benign     Fatigue     GERD (gastroesophageal reflux disease)     Hiatal hernia     History of chicken pox     Hyperglycemia     Hypertension     Hypokalemia     Neck pain     Paresthesia     Pruritic disorder     Thyroid disease     Weight gain        Past Surgical History  Past Surgical History:   Procedure Laterality Date    ABLATION N/A 9/28/2018    Procedure: Ablation;   Surgeon: Bunny Humphreys MD;  Location: [facility] CATH LAB;  Service: Cardiology;  Laterality: N/A;  SVT ,RFA, CHELE, MAC, SK ,3 Prep    ESOPHAGOGASTRODUODENOSCOPY      HYSTERECTOMY  2000    OOPHORECTOMY  2000    RHINOPLASTY TIP      TONSILLECTOMY, ADENOIDECTOMY         Family History  History reviewed. No pertinent family history.    Social History  Social History     Socioeconomic History    Marital status:    Tobacco Use    Smoking status: Never    Smokeless tobacco: Never   Substance and Sexual Activity    Alcohol use: No    Drug use: No    Sexual activity: Yes     Partners: Male        Review of Systems  14-point review of systems as follows:   No check beny indicates NEGATIVE response   Constitutional: [] weight loss, [] change to appetite   Eyes: [] change in vision, [] double vision   Ears, nose, mouth, throat: [] frequent nose bleeds, [] ringing in the ears   Respiratory: [] cough, [] wheezing   Cardiovascular: [] chest pain, [] palpitations   Gastrointestinal: [] jaundice, [] nausea/vomiting   Genitourinary: [] incontinence, [] burning with urination   Hematologic/lymphatic: [] easy bruising/bleeding, [] night sweats   Neurological: [] numbness, [] weakness   Endocrine: [x] fatigue, [] heat/cold intolerance   Allergy/Immunologic: [] fevers, [x] chills   Musculoskeletal: [x] muscle pain, [x] joint pain   Psychiatric: [] thoughts of harming self/others, [] depression   Integumentary: [] rashes, [] sores that do not heal     Objective:        Vitals:    07/20/23 1008   BP: 129/84   Pulse: 73   Resp: 17     Body mass index is 37.18 kg/m².    7/20/23  Constitutional: appears in no acute distress, well-developed, well-nourished     Eyes: normal conjunctiva, PERRLA    Ears, nose, mouth, throat: external appearance of ears and nose normal, hearing intact. Tongue scalloping      Cardiovascular: regular rate and rhythm, no murmurs appreciated    Respiratory: unlabored respirations, breath sounds normal  bilaterally    Gastrointestinal: no visible abdominal masses, no guarding, no visible hernia    Musculoskeletal: normal tone in all four extremities. No abnormal movements. No pronator drift. No orbit. Symmetric finger tapping. Normal station. Normal regular gait. Normal tandem gait.      Spine:   CERVICAL SPINE:  ROM: mildly restricted    MUSCLE SPASM: bilateral    FACET LOADING: bilateral    SPURLING: no  RHONDA / BRODERICK tender: no     Psychiatric: normal judgment and insight. Oriented to person, place, and time.     Neurologic:   Cortical functions: recent and remote memory intact, normal attention span and concentration, speech fluent, adequate fund of knowledge   Cranial nerves: visual fields full, PERRLA, EOMI, symmetric facial strength, hearing intact, palate elevates symmetrically, shoulder shrug 5/5, tongue protrudes midline   Reflexes: 2+ in the upper and lower extremities, no Cox  Sensation: intact to temperature throughout   Coordination: normal finger to nose, heel to shin    Data Review:     I have personally reviewed the referring provider's notes, labs, & imaging made available to me today.      RADIOLOGY STUDIES:  I have personally reviewed the pertinent images performed.       Results for orders placed or performed during the hospital encounter of 07/03/23   CT Head Without Contrast    Narrative    EXAMINATION:  CT HEAD WITHOUT CONTRAST    CLINICAL HISTORY:  Chronic headaches.    TECHNIQUE:  Axial CT images were obtained of the brain without intravenous contrast.  Coronal and sagittal reformations were obtained.  Automated exposure control utilized to reduce radiation dose.  Total exam DLP is 511 mGy cm.    COMPARISON:  None    FINDINGS:  Gray-white matter differentiation is within normal limits. There is chronic involutional change.  There is chronic white matter microischemic change.  There is intracranial atherosclerosis.  No acute intracranial hemorrhage, extra-axial fluid collection,  hydrocephalus, mass effect, or midline shift is noted.  No large vessel territory acute ischemia is identified.  Visualized paranasal sinuses are clear.  Visualized mastoid air cells are clear.  No acute displaced calvarial fracture is identified.      Impression    1. No acute intracranial abnormalities identified.      Electronically signed by: Shamir Spivey MD  Date:    07/03/2023  Time:    12:00       Lab Results   Component Value Date     06/16/2023     06/16/2023    K 4.1 06/16/2023    K 4.1 06/16/2023    MG 2.1 09/11/2018     06/16/2023     06/16/2023    CO2 29 06/16/2023    CO2 29 06/16/2023    BUN 20 (H) 06/16/2023    BUN 20 (H) 06/16/2023    CREATININE 0.79 06/16/2023    CREATININE 0.79 06/16/2023     (H) 06/16/2023     (H) 06/16/2023    HGBA1C 6.7 (H) 06/16/2023    HGBA1C 6.6 06/11/2020    AST 35 06/16/2023    AST 35 06/16/2023    ALT 36 (H) 06/16/2023    ALT 36 (H) 06/16/2023    ALBUMIN 4.4 06/16/2023    ALBUMIN 4.4 06/16/2023    PROT 7.1 06/16/2023    PROT 7.1 06/16/2023    BILITOT 0.4 06/16/2023    BILITOT 0.4 06/16/2023    CHOL 194 06/16/2023    HDL 50 06/16/2023    LDLCALC 113.4 06/16/2023    TRIG 153 (H) 06/16/2023       Lab Results   Component Value Date    WBC 7.97 06/16/2023    WBC 7.97 06/16/2023    HGB 14.1 06/16/2023    HGB 14.1 06/16/2023    HCT 44.2 06/16/2023    HCT 44.2 06/16/2023    MCV 80 (L) 06/16/2023    MCV 80 (L) 06/16/2023     06/16/2023     06/16/2023       Lab Results   Component Value Date    TSH 0.881 06/16/2023           Assessment & Plan:       Problem List Items Addressed This Visit          Neuro    Intractable migraine without aura and without status migrainosus - Primary    Overview     Rare episodes and none in last 10 years. Headaches are typically unilateral, moderate to severe in intensity, worsen with activity, pounding in quality and associated with nausea.  Add magnesium and Nurtec.           Relevant Medications     rimegepant (NURTEC) 75 mg odt       Cardiac/Vascular    SVT (supraventricular tachycardia)    Current Assessment & Plan     Cardiac contraindication to triptans             Endocrine    Controlled type 2 diabetes mellitus without complication, without long-term current use of insulin    Current Assessment & Plan     Most recent A1C 6.7. avoid steroid taper          Other Visit Diagnoses       New onset of headaches after age 50        Warrants evaluation wt MRI.     Relevant Orders    MRI Brain W WO Contrast    Cervical myofascial pain syndrome        Relevant Medications    tiZANidine (ZANAFLEX) 4 MG tablet    Bruxism        Relevant Medications    tiZANidine (ZANAFLEX) 4 MG tablet                Please call our clinic at [phone] or send a message on the [employer] portal if there are any changes to the plan described below, for example,if you are not contacted for the requested tests, referral(s) within one week, if you are unable to receive the medications prescribed, or if you feel you need to change the treatment course for any reason.     TESTING:  -- MRI    REFERRALS:  -- none at this time but can do a sleep study in the future    PREVENTION (use daily regardless of headache):  -- start magnesium in ONE of the following preparations -               1. Magnesium oxide 800mg daily (the most common over the counter kind, may causes loose stools)              2. Magnesium citrate 400-500mg daily (harder to find, but more neutral on the bowels)              3. Magnesium glycinate 400mg daily (hardest to find, look online, but most bowel-neutral, best absorbed)     AS-NEEDED TREATMENT (use total no more than 10 days per month unless otherwise stated):  -- START Nurtec once daily as needed. To break current cycle, take once daily for 3 days then every other day for a week. Then as needed after that  -- START tizanidine at night. This is a muscle relaxer and it will also make you potentially sleepy. Start with  half a tablet to see how you respond but can take up to a whole tablet if needed. Do not take with baclofen and/or flexeril       Follow up in about 2 months (around 9/20/2023).    Face to Face time with patient: 45  60 minutes of total time spent on the encounter, which includes face to face time and non-face to face time on day of visit preparing to see the patient (eg, review of tests), Obtaining and/or reviewing separately obtained history, Documenting clinical information in the electronic or other health record, Independently interpreting results (not separately reported) and communicating results to the patient/family/caregiver, or Care coordination (not separately reported).     Violette Denney, NP

## 2023-08-08 ENCOUNTER — TELEPHONE (OUTPATIENT)
Dept: NEUROLOGY | Facility: CLINIC | Age: 63
End: 2023-08-08
Payer: OTHER GOVERNMENT

## 2023-08-08 NOTE — TELEPHONE ENCOUNTER
----- Message from Karl Rosas sent at 8/8/2023 11:59 AM CDT -----  Contact: Hilda  Type: Needs Medical Advice  Who Called:  Hilda/ [facility]    Best Call Back Number: [phone]    Additional Information: States she would like to speak with office regarding pt MRI says she need more clinic notes pt appt is tomorrow morning .Please call back   Fax # [phone]

## 2023-08-08 NOTE — TELEPHONE ENCOUNTER
----- Message from Karl Rosas sent at 8/8/2023 11:59 AM CDT -----  Contact: Hilda  Type: Needs Medical Advice  Who Called:  Hilda/ [facility]    Best Call Back Number: [phone]    Additional Information: States she would like to speak with office regarding pt MRI says she need more clinic notes pt appt is tomorrow morning .Please call back   Fax # [phone]

## 2023-08-08 NOTE — TELEPHONE ENCOUNTER
Called spoke with [facility], requesting clinic notes to be send for MRI scheduled with them tomorrow.  Advised I will need [facility] to fax patient clinic notes.  She could call medical records or Pre-Service department for the Prior Authorization .

## 2023-08-10 ENCOUNTER — TELEPHONE (OUTPATIENT)
Dept: NEUROLOGY | Facility: CLINIC | Age: 63
End: 2023-08-10
Payer: OTHER GOVERNMENT

## 2023-08-10 ENCOUNTER — DOCUMENTATION ONLY (OUTPATIENT)
Dept: NEUROLOGY | Facility: CLINIC | Age: 63
End: 2023-08-10
Payer: OTHER GOVERNMENT

## 2023-08-17 ENCOUNTER — OFFICE VISIT (OUTPATIENT)
Dept: RHEUMATOLOGY | Facility: CLINIC | Age: 63
End: 2023-08-17
Payer: OTHER GOVERNMENT

## 2023-08-17 VITALS
BODY MASS INDEX: 36.91 KG/M2 | DIASTOLIC BLOOD PRESSURE: 74 MMHG | HEART RATE: 96 BPM | HEIGHT: 65 IN | WEIGHT: 221.56 LBS | SYSTOLIC BLOOD PRESSURE: 107 MMHG

## 2023-08-17 DIAGNOSIS — R71.8 MICROCYTIC RED BLOOD CELLS: Primary | ICD-10-CM

## 2023-08-17 DIAGNOSIS — L40.8 PSORIASIS WITH PUSTULES: ICD-10-CM

## 2023-08-17 DIAGNOSIS — L50.8 CHRONIC URTICARIA: ICD-10-CM

## 2023-08-17 DIAGNOSIS — L40.50 PSA (PSORIATIC ARTHRITIS): ICD-10-CM

## 2023-08-17 PROCEDURE — 99214 PR OFFICE/OUTPT VISIT, EST, LEVL IV, 30-39 MIN: ICD-10-PCS | Mod: S$GLB,,, | Performed by: INTERNAL MEDICINE

## 2023-08-17 PROCEDURE — 99999 PR PBB SHADOW E&M-EST. PATIENT-LVL V: CPT | Mod: PBBFAC,,, | Performed by: INTERNAL MEDICINE

## 2023-08-17 PROCEDURE — 99999 PR PBB SHADOW E&M-EST. PATIENT-LVL V: ICD-10-PCS | Mod: PBBFAC,,, | Performed by: INTERNAL MEDICINE

## 2023-08-17 PROCEDURE — 99214 OFFICE O/P EST MOD 30 MIN: CPT | Mod: S$GLB,,, | Performed by: INTERNAL MEDICINE

## 2023-08-17 RX ORDER — LEVOCETIRIZINE DIHYDROCHLORIDE 5 MG/1
5 TABLET, FILM COATED ORAL 2 TIMES DAILY
Qty: 60 TABLET | Refills: 5 | Status: SHIPPED | OUTPATIENT
Start: 2023-08-17

## 2023-08-17 ASSESSMENT — ROUTINE ASSESSMENT OF PATIENT INDEX DATA (RAPID3)
PATIENT GLOBAL ASSESSMENT SCORE: 1
FATIGUE SCORE: 1.1
MDHAQ FUNCTION SCORE: 0.3
TOTAL RAPID3 SCORE: 1.83
PSYCHOLOGICAL DISTRESS SCORE: 1.1
PAIN SCORE: 3.5

## 2023-08-17 NOTE — PROGRESS NOTES
Subjective:       Patient ID: Dennise Watkins is a 63 y.o. female.    Chief Complaint: Disease Management    Follow up: 63 year old female who presents to clinic for follow up on psoriatic arthritis. Scalp psoriasis last 2 week she has more stiffness throughout her body that is worse in the middle of the night.  Psoriasis is minimal on the scalp.  Esr , crp elevated,         We reviewed her recent labs: CRP remains elevated 3.6 , esr normal. A1c 6.7      Prior tx:  1. NSAIDs      Review of Systems   Constitutional:  Positive for activity change. Negative for appetite change and chills.   Eyes:  Negative for visual disturbance.   Respiratory:  Negative for cough and shortness of breath.    Cardiovascular:  Negative for chest pain, palpitations and leg swelling.   Gastrointestinal:  Negative for abdominal pain, constipation, diarrhea, nausea and vomiting.   Musculoskeletal:  Positive for arthralgias, gait problem, neck pain and neck stiffness.   Neurological:  Negative for dizziness, weakness and light-headedness.         Objective:     Vitals:    08/17/23 1054   BP: 107/74   Pulse: 96       Past Medical History:   Diagnosis Date    Abnormal blood chemistry     ADD (attention deficit disorder)     Anemia     Anxiety disorder     Arthralgia     Breast mass, left     Diabetes mellitus     Dysuria     Epistaxis     Essential hypertension, benign     Fatigue     GERD (gastroesophageal reflux disease)     Hiatal hernia     History of chicken pox     Hyperglycemia     Hypertension     Hypokalemia     Neck pain     Paresthesia     Pruritic disorder     Thyroid disease     Weight gain      Past Surgical History:   Procedure Laterality Date    ABLATION N/A 9/28/2018    Procedure: Ablation;  Surgeon: Bunny Humphreys MD;  Location: [facility] CATH LAB;  Service: Cardiology;  Laterality: N/A;  SVT ,RFA, CHELE, MAC, SK ,3 Prep    ESOPHAGOGASTRODUODENOSCOPY      HYSTERECTOMY  2000    OOPHORECTOMY  2000    RHINOPLASTY TIP      TONSILLECTOMY,  ADENOIDECTOMY            Physical Exam   Eyes: Right conjunctiva is not injected. Left conjunctiva is not injected. Right eye exhibits normal extraocular motion. Left eye exhibits normal extraocular motion.   Neck: No JVD present. No thyromegaly present.   Cardiovascular: Normal rate and regular rhythm. Exam reveals no decreased pulses.   Pulmonary/Chest: She has no wheezes. She has no rhonchi. She has no rales.   Musculoskeletal:         General: Swelling and tenderness present.      Right shoulder: Tenderness present.      Left shoulder: Tenderness present.      Right elbow: Normal.      Left elbow: Normal.      Right wrist: Normal.      Left wrist: Normal.      Right knee: Normal.      Left knee: Tenderness present.   Lymphadenopathy:     She has no cervical adenopathy.   Neurological: Gait normal.   Skin: No rash noted.   Psychiatric: Mood and affect normal.       Right Side Rheumatological Exam     Examination finds the elbow, wrist, knee, 1st PIP, 1st MCP, 2nd PIP, 2nd MCP, 3rd PIP, 3rd MCP, 4th PIP, 4th MCP, 5th PIP and 5th MCP normal.    The patient is tender to palpation of the shoulder    Left Side Rheumatological Exam     Examination finds the elbow, wrist, 1st PIP, 1st MCP, 2nd PIP, 2nd MCP, 3rd PIP, 3rd MCP, 4th PIP, 4th MCP, 5th PIP and 5th MCP normal.    The patient is tender to palpation of the shoulder and knee.          Results for orders placed or performed in visit on 06/16/23   JODI Screen w/Reflex   Result Value Ref Range    JODI Screen None Detected None Detected   Sedimentation rate   Result Value Ref Range    Sed Rate 33 (H) 0 - 29 mm/Hr   C-Reactive Protein   Result Value Ref Range    CRP 3.20 (H) 0.00 - 0.90 mg/dL   CBC Auto Differential   Result Value Ref Range    WBC 7.97 3.90 - 12.70 K/uL    RBC 5.52 (H) 4.00 - 5.40 M/uL    Hemoglobin 14.1 12.0 - 16.0 g/dL    Hematocrit 44.2 37.0 - 48.5 %    MCV 80 (L) 82 - 98 fL    MCH 25.5 (L) 27.0 - 31.0 pg    MCHC 31.9 (L) 32.0 - 36.0 g/dL    RDW 14.6  (H) 11.5 - 14.5 %    Platelets 341 150 - 450 K/uL    MPV 10.9 9.2 - 12.9 fL    Immature Granulocytes 0.4 0.0 - 0.5 %    Gran # (ANC) 5.1 1.8 - 7.7 K/uL    Immature Grans (Abs) 0.03 0.00 - 0.04 K/uL    Lymph # 2.2 1.0 - 4.8 K/uL    Mono # 0.4 0.3 - 1.0 K/uL    Eos # 0.1 0.0 - 0.5 K/uL    Baso # 0.06 0.00 - 0.20 K/uL    nRBC 0 0 /100 WBC    Gran % 64.5 38.0 - 73.0 %    Lymph % 28.0 18.0 - 48.0 %    Mono % 4.9 4.0 - 15.0 %    Eosinophil % 1.4 0.0 - 8.0 %    Basophil % 0.8 0.0 - 1.9 %    Differential Method Automated    Comprehensive Metabolic Panel   Result Value Ref Range    Sodium 140 136 - 145 mmol/L    Potassium 4.1 3.5 - 5.1 mmol/L    Chloride 103 95 - 110 mmol/L    CO2 29 22 - 31 mmol/L    Glucose 126 (H) 70 - 110 mg/dL    BUN 20 (H) 7 - 18 mg/dL    Creatinine 0.79 0.50 - 1.40 mg/dL    Calcium 9.9 8.4 - 10.2 mg/dL    Total Protein 7.1 6.0 - 8.4 g/dL    Albumin 4.4 3.5 - 5.2 g/dL    Total Bilirubin 0.4 0.2 - 1.3 mg/dL    Alkaline Phosphatase 106 38 - 145 U/L    AST 35 14 - 36 U/L    ALT 36 (H) 0 - 35 U/L    Anion Gap 8 mmol/L    eGFR >60 >60 mL/min/1.73 m^2   Sedimentation rate   Result Value Ref Range    Sed Rate 33 (H) 0 - 29 mm/Hr   CBC Auto Differential   Result Value Ref Range    WBC 7.97 3.90 - 12.70 K/uL    RBC 5.52 (H) 4.00 - 5.40 M/uL    Hemoglobin 14.1 12.0 - 16.0 g/dL    Hematocrit 44.2 37.0 - 48.5 %    MCV 80 (L) 82 - 98 fL    MCH 25.5 (L) 27.0 - 31.0 pg    MCHC 31.9 (L) 32.0 - 36.0 g/dL    RDW 14.6 (H) 11.5 - 14.5 %    Platelets 341 150 - 450 K/uL    MPV 10.9 9.2 - 12.9 fL    Immature Granulocytes 0.4 0.0 - 0.5 %    Gran # (ANC) 5.1 1.8 - 7.7 K/uL    Immature Grans (Abs) 0.03 0.00 - 0.04 K/uL    Lymph # 2.2 1.0 - 4.8 K/uL    Mono # 0.4 0.3 - 1.0 K/uL    Eos # 0.1 0.0 - 0.5 K/uL    Baso # 0.06 0.00 - 0.20 K/uL    nRBC 0 0 /100 WBC    Gran % 64.5 38.0 - 73.0 %    Lymph % 28.0 18.0 - 48.0 %    Mono % 4.9 4.0 - 15.0 %    Eosinophil % 1.4 0.0 - 8.0 %    Basophil % 0.8 0.0 - 1.9 %    Differential Method  Automated    Comprehensive Metabolic Panel   Result Value Ref Range    Sodium 140 136 - 145 mmol/L    Potassium 4.1 3.5 - 5.1 mmol/L    Chloride 103 95 - 110 mmol/L    CO2 29 22 - 31 mmol/L    Glucose 126 (H) 70 - 110 mg/dL    BUN 20 (H) 7 - 18 mg/dL    Creatinine 0.79 0.50 - 1.40 mg/dL    Calcium 9.9 8.4 - 10.2 mg/dL    Total Protein 7.1 6.0 - 8.4 g/dL    Albumin 4.4 3.5 - 5.2 g/dL    Total Bilirubin 0.4 0.2 - 1.3 mg/dL    Alkaline Phosphatase 106 38 - 145 U/L    AST 35 14 - 36 U/L    ALT 36 (H) 0 - 35 U/L    Anion Gap 8 mmol/L    eGFR >60 >60 mL/min/1.73 m^2   Lipid Panel   Result Value Ref Range    Cholesterol 194 120 - 199 mg/dL    Triglycerides 153 (H) 30 - 150 mg/dL    HDL 50 40 - 75 mg/dL    LDL Cholesterol 113.4 63.0 - 159.0 mg/dL    HDL/Cholesterol Ratio 25.8 20.0 - 50.0 %    Total Cholesterol/HDL Ratio 3.9 2.0 - 5.0    Non-HDL Cholesterol 144 mg/dL   Hemoglobin A1C   Result Value Ref Range    Hemoglobin A1C 6.7 (H) 0.0 - 5.6 %    Estimated Avg Glucose 146 (H) 68 - 131 mg/dL   Microalbumin/Creatinine Ratio, Urine   Result Value Ref Range    Microalbumin, Urine 13.7 ug/mL    Creatinine, Urine 392.0 (H) 15.0 - 325.0 mg/dL    Microalb/Creat Ratio 3.5 0.0 - 30.0 ug/mg   TSH   Result Value Ref Range    TSH 0.881 0.400 - 4.000 uIU/mL     Component Ref Range & Units 2 mo ago  (6/16/23) 2 mo ago  (6/16/23) 11 mo ago  (9/12/22) 1 yr ago  (2/11/22) 2 yr ago  (3/23/21) 2 yr ago  (10/16/20) 3 yr ago  (9/11/19)   WBC 3.90 - 12.70 K/uL 7.97  7.97  7.41  7.3 R  8.8 R  7.4 R  10.8 R    RBC 4.00 - 5.40 M/uL 5.52 High   5.52 High   5.02  5.14 High  R  5.44 High  R  5.23 High  R  5.34 High  R    Hemoglobin 12.0 - 16.0 g/dL 14.1  14.1  13.2  13.7 R  14.1 R  13.9 R  13.2 R    Hematocrit 37.0 - 48.5 % 44.2  44.2  40.9  41.0 R  44.0 R  42.6 R  42.7 R    MCV 82 - 98 fL 80 Low   80 Low   82  79.8 Low  R  80.9 R  81.5 R  80.0 R    MCH 27.0 - 31.0 pg 25.5 Low   25.5 Low   26.3 Low   26.7 Low  R  25.9 Low  R  26.6 Low  R  24.7 Low  R     MCHC 32.0 - 36.0 g/dL 31.9 Low   31.9 Low   32.3  33.4  32.0  32.6  30.9 Low     RDW 11.5 - 14.5 % 14.6 High   14.6 High   13.9  14.1 R  14.4 R  14.4 R  15.0 R    Platelets 150 - 450 K/uL 341  341  323  342 R  365 R  311 R  377 R    MPV 9.2 - 12.9 fL 10.9  10.9  11.1  10.5 R  11.9 R  11.5 R  10.9 R    Immature Granulocytes 0.0 - 0.5 % 0.4  0.4  0.3        Gran # (ANC) 1.8 - 7.7 K/uL 5.1  5.1  4.2        Immature Grans (Abs) 0.00 - 0.04 K/uL 0.03  0.03 CM  0.02 CM        Comment: Mild elevation in immature granulocytes is non specific and   can be seen in a variety of conditions including stress response,         Assessment:       1. Microcytic red blood cells    2. PSA (psoriatic arthritis)    3. Psoriasis with pustules    4. Chronic urticaria                Plan:       Microcytic red blood cells  -     Transferrin; Future; Expected date: 08/17/2023  -     Iron and TIBC; Future; Expected date: 08/17/2023  -     Ambulatory referral/consult to Hematology / Oncology; Future; Expected date: 08/24/2023  -     Sedimentation rate; Future; Expected date: 08/17/2023  -     C-Reactive Protein; Future; Expected date: 08/17/2023  -     Comprehensive Metabolic Panel; Future; Expected date: 08/17/2023  -     CBC Auto Differential; Future; Expected date: 08/17/2023    PSA (psoriatic arthritis)  -     Sedimentation rate; Future; Expected date: 08/17/2023  -     C-Reactive Protein; Future; Expected date: 08/17/2023  -     Comprehensive Metabolic Panel; Future; Expected date: 08/17/2023  -     CBC Auto Differential; Future; Expected date: 08/17/2023    Psoriasis with pustules  -     Sedimentation rate; Future; Expected date: 08/17/2023  -     C-Reactive Protein; Future; Expected date: 08/17/2023  -     Comprehensive Metabolic Panel; Future; Expected date: 08/17/2023  -     CBC Auto Differential; Future; Expected date: 08/17/2023    Chronic urticaria  -     levocetirizine (XYZAL) 5 MG tablet; Take 1 tablet (5 mg total) by mouth 2  (two) times daily.  Dispense: 60 tablet; Refill: 5            Assessment:  63 year old female with  Psoriatic arthritis, elevated CRP  --history of covid infection  --controlled type 2 diabetes, HgbA1c 6.3%  --GERD      Plan:  1.  She is holding treatment, consider otezla  2. we will recheck labs  labs in 6 months     More than 50% of the  30 minute encounter was spent face to face counseling the patient regarding current status and future plan of care as well as side effects  of the medications. All questions were answered to patient's satisfaction also includes  non-face to face time preparing to see the patient (eg, review of tests), Obtaining and/or reviewing separately obtained history, Documenting clinical information in the electronic or other health record, Independently interpreting results

## 2023-08-17 NOTE — PATIENT INSTRUCTIONS
[facility]    Address: [address], [place] [address]  Open ? Closes 5?PM  Phone: [phone]

## 2023-08-21 ENCOUNTER — TELEPHONE (OUTPATIENT)
Dept: HEMATOLOGY/ONCOLOGY | Facility: CLINIC | Age: 63
End: 2023-08-21
Payer: OTHER GOVERNMENT

## 2023-08-21 NOTE — TELEPHONE ENCOUNTER
Called pt about hem referral.   Pt stated she discussed with Dr Gonzalez that they were going to wait 6 mths, then repeat the labs before she decides to see hematology.   Voiced understanding.

## 2023-09-23 NOTE — PROGRESS NOTES
MRI without contrast received from [facility]. Impression is consistent with small vessel disease chronic white matter ischemia.      today

## 2023-10-20 DIAGNOSIS — F41.9 ANXIETY: ICD-10-CM

## 2023-10-24 RX ORDER — ALPRAZOLAM 0.25 MG/1
TABLET ORAL
Qty: 28 TABLET | Refills: 1 | Status: SHIPPED | OUTPATIENT
Start: 2023-10-24

## 2024-03-04 ENCOUNTER — TELEPHONE (OUTPATIENT)
Dept: RHEUMATOLOGY | Facility: CLINIC | Age: 64
End: 2024-03-04
Payer: OTHER GOVERNMENT

## 2024-03-04 NOTE — TELEPHONE ENCOUNTER
----- Message from Jeannette Omalley sent at 3/4/2024 10:22 AM CST -----  Caller is requesting to schedule their Lab appointment prior to annual appointment.    Name of Caller:  Pt    Would the patient rather a call back or a response via [facility]?   Call back    Best Call Back Number:   [phone]    Additional Information:  Pt is needing paper orders for labs to take to [facility]. She also would like to included A1C. She wants to get these done before 3/11 appt.   Please call back to advise. Thanks!

## 2024-03-07 DIAGNOSIS — R71.8 MICROCYTIC RED BLOOD CELLS: Primary | ICD-10-CM

## 2024-03-07 DIAGNOSIS — E11.9 CONTROLLED TYPE 2 DIABETES MELLITUS WITHOUT COMPLICATION, WITHOUT LONG-TERM CURRENT USE OF INSULIN: ICD-10-CM

## 2024-03-07 DIAGNOSIS — R79.82 ELEVATED C-REACTIVE PROTEIN (CRP): ICD-10-CM

## 2024-03-07 DIAGNOSIS — L40.50 PSA (PSORIATIC ARTHRITIS): ICD-10-CM

## 2024-03-08 LAB
ALBUMIN SERPL-MCNC: 4.4 G/DL (ref 3.9–4.9)
ALBUMIN/GLOB SERPL: 1.8 {RATIO} (ref 1.2–2.2)
ALP SERPL-CCNC: 113 IU/L (ref 44–121)
ALT SERPL-CCNC: 27 IU/L (ref 0–32)
AST SERPL-CCNC: 28 IU/L (ref 0–40)
BASOPHILS # BLD AUTO: 0.1 X10E3/UL (ref 0–0.2)
BASOPHILS NFR BLD AUTO: 1 %
BILIRUB SERPL-MCNC: 0.3 MG/DL (ref 0–1.2)
BUN SERPL-MCNC: 18 MG/DL (ref 8–27)
BUN/CREAT SERPL: 23 (ref 12–28)
CALCIUM SERPL-MCNC: 10.6 MG/DL (ref 8.7–10.3)
CHLORIDE SERPL-SCNC: 102 MMOL/L (ref 96–106)
CO2 SERPL-SCNC: 22 MMOL/L (ref 20–29)
CREAT SERPL-MCNC: 0.77 MG/DL (ref 0.57–1)
CRP SERPL-MCNC: 25 MG/L (ref 0–10)
EOSINOPHIL # BLD AUTO: 0.2 X10E3/UL (ref 0–0.4)
EOSINOPHIL NFR BLD AUTO: 2 %
ERYTHROCYTE [DISTWIDTH] IN BLOOD BY AUTOMATED COUNT: 14.7 % (ref 11.7–15.4)
EST. GFR  (NO RACE VARIABLE): 86 ML/MIN/1.73
GLOBULIN SER CALC-MCNC: 2.4 G/DL (ref 1.5–4.5)
GLUCOSE SERPL-MCNC: 164 MG/DL (ref 70–99)
HBA1C MFR BLD: 7.2 % (ref 4.8–5.6)
HCT VFR BLD AUTO: 45.6 % (ref 34–46.6)
HGB BLD-MCNC: 14.4 G/DL (ref 11.1–15.9)
IMM GRANULOCYTES # BLD AUTO: 0 X10E3/UL (ref 0–0.1)
IMM GRANULOCYTES NFR BLD AUTO: 0 %
IRON SATN MFR SERPL: 21 % (ref 15–55)
IRON SERPL-MCNC: 70 UG/DL (ref 27–139)
LYMPHOCYTES # BLD AUTO: 2.7 X10E3/UL (ref 0.7–3.1)
LYMPHOCYTES NFR BLD AUTO: 31 %
MCH RBC QN AUTO: 26 PG (ref 26.6–33)
MCHC RBC AUTO-ENTMCNC: 31.6 G/DL (ref 31.5–35.7)
MCV RBC AUTO: 82 FL (ref 79–97)
MONOCYTES # BLD AUTO: 0.4 X10E3/UL (ref 0.1–0.9)
MONOCYTES NFR BLD AUTO: 5 %
NEUTROPHILS # BLD AUTO: 5.4 X10E3/UL (ref 1.4–7)
NEUTROPHILS NFR BLD AUTO: 61 %
PLATELET # BLD AUTO: 338 X10E3/UL (ref 150–450)
POTASSIUM SERPL-SCNC: 4.3 MMOL/L (ref 3.5–5.2)
PROT SERPL-MCNC: 6.8 G/DL (ref 6–8.5)
RBC # BLD AUTO: 5.54 X10E6/UL (ref 3.77–5.28)
SODIUM SERPL-SCNC: 140 MMOL/L (ref 134–144)
TIBC SERPL-MCNC: 330 UG/DL (ref 250–450)
TRANSFERRIN SERPL-MCNC: 273 MG/DL (ref 192–364)
UIBC SERPL-MCNC: 260 UG/DL (ref 118–369)
WBC # BLD AUTO: 8.8 X10E3/UL (ref 3.4–10.8)

## 2024-03-11 ENCOUNTER — OFFICE VISIT (OUTPATIENT)
Dept: RHEUMATOLOGY | Facility: CLINIC | Age: 64
End: 2024-03-11
Payer: OTHER GOVERNMENT

## 2024-03-11 VITALS
SYSTOLIC BLOOD PRESSURE: 126 MMHG | DIASTOLIC BLOOD PRESSURE: 84 MMHG | BODY MASS INDEX: 36.1 KG/M2 | WEIGHT: 216.69 LBS | HEIGHT: 65 IN | HEART RATE: 101 BPM

## 2024-03-11 DIAGNOSIS — B37.0 THRUSH OF MOUTH AND ESOPHAGUS: ICD-10-CM

## 2024-03-11 DIAGNOSIS — B37.81 THRUSH OF MOUTH AND ESOPHAGUS: ICD-10-CM

## 2024-03-11 DIAGNOSIS — R79.82 ELEVATED C-REACTIVE PROTEIN (CRP): ICD-10-CM

## 2024-03-11 DIAGNOSIS — L40.50 PSA (PSORIATIC ARTHRITIS): ICD-10-CM

## 2024-03-11 DIAGNOSIS — E11.69 TYPE 2 DIABETES MELLITUS WITH OTHER SPECIFIED COMPLICATION, UNSPECIFIED WHETHER LONG TERM INSULIN USE: Primary | ICD-10-CM

## 2024-03-11 DIAGNOSIS — E11.9 TYPE 2 DIABETES MELLITUS WITHOUT COMPLICATION, WITHOUT LONG-TERM CURRENT USE OF INSULIN: ICD-10-CM

## 2024-03-11 DIAGNOSIS — B37.2 CANDIDAL DERMATITIS: ICD-10-CM

## 2024-03-11 PROCEDURE — 99215 OFFICE O/P EST HI 40 MIN: CPT | Mod: S$GLB,,, | Performed by: INTERNAL MEDICINE

## 2024-03-11 PROCEDURE — 99999 PR PBB SHADOW E&M-EST. PATIENT-LVL IV: CPT | Mod: PBBFAC,,, | Performed by: INTERNAL MEDICINE

## 2024-03-11 RX ORDER — FLUCONAZOLE 150 MG/1
150 TABLET ORAL DAILY
Qty: 7 TABLET | Refills: 3 | Status: SHIPPED | OUTPATIENT
Start: 2024-03-11 | End: 2024-04-08

## 2024-03-11 RX ORDER — PIROXICAM 20 MG/1
20 CAPSULE ORAL DAILY
Qty: 90 CAPSULE | Refills: 3 | Status: SHIPPED | OUTPATIENT
Start: 2024-03-11 | End: 2025-03-11

## 2024-03-11 RX ORDER — BLOOD-GLUCOSE SENSOR
1 EACH MISCELLANEOUS
Qty: 2 EACH | Refills: 12 | Status: SHIPPED | OUTPATIENT
Start: 2024-03-11 | End: 2025-03-11

## 2024-03-11 RX ORDER — TIRZEPATIDE 2.5 MG/.5ML
2.5 INJECTION, SOLUTION SUBCUTANEOUS
Qty: 4 PEN | Refills: 3 | Status: SHIPPED | OUTPATIENT
Start: 2024-03-11 | End: 2024-09-07

## 2024-03-11 RX ORDER — CLOTRIMAZOLE 1 %
CREAM (GRAM) TOPICAL 2 TIMES DAILY
Qty: 113 G | Refills: 3 | Status: SHIPPED | OUTPATIENT
Start: 2024-03-11 | End: 2025-03-11

## 2024-03-11 RX ORDER — TIRZEPATIDE 5 MG/.5ML
5 INJECTION, SOLUTION SUBCUTANEOUS
Qty: 4 PEN | Refills: 5 | Status: SHIPPED | OUTPATIENT
Start: 2024-03-11 | End: 2024-09-07

## 2024-03-11 ASSESSMENT — ROUTINE ASSESSMENT OF PATIENT INDEX DATA (RAPID3)
PSYCHOLOGICAL DISTRESS SCORE: 1.1
PATIENT GLOBAL ASSESSMENT SCORE: 3
FATIGUE SCORE: 1.1
TOTAL RAPID3 SCORE: 2.44
MDHAQ FUNCTION SCORE: 0.7
PAIN SCORE: 2

## 2024-03-11 NOTE — PROGRESS NOTES
Subjective:     Patient ID:  Dennise Watkins    Chief Complaint:  Disease Management     History of Present Illness:  Pt is a 64 y.o. female Follow up: 63 year old female who presents to clinic for follow up on psoriatic arthritis. Scalp psoriasis last 2 week she has more stiffness throughout her body that is worse in the middle of the night.  Psoriasis is minimal on the scalp.  Esr , crp elevated,            Prior tx:  1. NSAIDs  Rheumatologic History:   - Diagnosis/es:  - Positive serologies:  - Infectious screening labs:  - Previous Treatments:  - Current Treatments:     Interval History:   Hospitalization since last office visit: No    Patient Active Problem List    Diagnosis Date Noted    Intractable migraine without aura and without status migrainosus 07/20/2023    Colon cancer screening 02/06/2023    Psoriatic arthritis 02/06/2023    Current mild episode of major depressive disorder without prior episode 02/06/2023    Menopause 08/27/2020    Controlled type 2 diabetes mellitus without complication, without long-term current use of insulin 08/03/2020    Chronic rhinitis 03/07/2019    Pollen-food allergy 03/07/2019    Chronic urticaria 03/07/2019    Hypothyroidism 03/07/2019    Simple chronic conjunctivitis of both eyes 03/07/2019    SVT (supraventricular tachycardia) 09/24/2018    Anxiety disorder 09/24/2018    Hypertension, well controlled 07/06/2016    Recurrent UTI 07/01/2016    DM (diabetes mellitus), type 2, uncontrolled 07/01/2016     Past Surgical History:   Procedure Laterality Date    ABLATION N/A 9/28/2018    Procedure: Ablation;  Surgeon: Bunny Humphreys MD;  Location: [facility] CATH LAB;  Service: Cardiology;  Laterality: N/A;  SVT ,RFA, CHELE, MAC, SK ,3 Prep    ESOPHAGOGASTRODUODENOSCOPY      HYSTERECTOMY  2000    OOPHORECTOMY  2000    RHINOPLASTY TIP      TONSILLECTOMY, ADENOIDECTOMY       Social History     Tobacco Use    Smoking status: Never    Smokeless tobacco: Never   Substance Use Topics    Alcohol  use: No    Drug use: No     No family history on file.  Review of patient's allergies indicates:   Allergen Reactions    Sulfa (sulfonamide antibiotics) Hives    Aleve [naproxen sodium] Other (See Comments)     Nose bleed    Amoxicillin-pot clavulanate Diarrhea     Other reaction(s): diarrhea       Review of Systems   Review of Systems     Current Medications:  Current Outpatient Medications   Medication Instructions    ALPRAZolam (XANAX) 0.25 MG tablet TAKE 1 TABLET BY MOUTH 4 TIMES A DAY AS NEEDED FOR ANXIETY    amLODIPine (NORVASC) 5 mg, Oral, Daily PRN    atorvastatin (LIPITOR) 10 mg, Oral, Daily    azelastine (ASTELIN) 137 mcg (0.1 %) nasal spray 1 spray, Nasal, 2 times daily PRN    baclofen (LIORESAL) 20 MG tablet TAKE 1 TABLET BY MOUTH THREE TIMES A DAY    blood-glucose sensor (FREESTYLE AVIS 3 SENSOR) Monie 1 Device, Misc.(Non-Drug; Combo Route), Every 14 days    clotrimazole (LOTRIMIN) 1 % cream APPLY TO AFFECTED AREA TWICE A DAY    clotrimazole (LOTRIMIN) 1 % cream Topical (Top), 2 times daily    EScitalopram oxalate (LEXAPRO) 5 MG Tab TAKE 1 TABLET BY MOUTH EVERY DAY    estradioL (ESTRACE) 1 mg, Oral, Daily    famotidine (PEPCID) 40 mg, Oral, Nightly PRN    fluconazole (DIFLUCAN) 150 mg, Oral, As needed (PRN)    fluconazole (DIFLUCAN) 150 mg, Oral, Daily    hydrOXYzine HCL (ATARAX) 25 mg, Oral, Nightly    ketoconazole (NIZORAL) 2 % cream 1 application , Topical (Top), 2 times daily    ketoconazole (NIZORAL) 2 % shampoo Topical (Top), Twice weekly    levocetirizine (XYZAL) 5 mg, Oral, 2 times daily    metoprolol succinate (TOPROL-XL) 25 mg, Oral, Daily    mometasone 0.1% (ELOCON) 0.1 % cream Apply to affected area daily    MOUNJARO 2.5 mg, Subcutaneous, Every 7 days    MOUNJARO 5 mg, Subcutaneous, Every 7 days    NATURE-THROID 65 mg Tab 1 tablet, Oral, Every morning    NURTEC 75 mg, Oral, Daily PRN, Place ODT tablet on the tongue; alternatively the ODT tablet may be placed under the tongue     "olmesartan-hydrochlorothiazide (BENICAR HCT) 40-12.5 mg Tab 1 tablet, Oral, Daily    olopatadine (PATANOL) 0.1 % ophthalmic solution 1 drop, Both Eyes, 2 times daily    omeprazole (PRILOSEC) 20 mg, Oral, 2 times daily    piroxicam (FELDENE) 20 mg, Oral, Daily    tirzepatide 7.5 mg, Subcutaneous, Every 7 days    tiZANidine (ZANAFLEX) 4 MG tablet Half or full tablet by mouth at night as needed for muscle spasm         Objective:     Vitals:    03/11/24 0957   BP: 126/84   Pulse: 101   Weight: 98.3 kg (216 lb 11.4 oz)   Height: 5' 5" (1.651 m)   PainSc:   3   PainLoc: Generalized      Body mass index is 36.06 kg/m².     Physical Examinations:  Physical Exam     Disease Assessment Scores:  Patient's Global Assessment of arthritis (0-10): 3  Physician's Global Assessment of arthritis (0-10): 3  Number of Tender Joints (0-28): 4  Number of Swollen Joints (0-28): 4    There is currently no information documented on the homunculus. Go to the Rheumatology activity and complete the homunculus joint exam.          No data to display                Monitoring Lab Results:  Lab Results   Component Value Date    WBC 8.8 03/07/2024    RBC 5.54 (H) 03/07/2024    HGB 14.4 03/07/2024    HCT 45.6 03/07/2024    MCV 82 03/07/2024    MCH 26.0 (L) 03/07/2024    MCHC 31.6 03/07/2024    RDW 14.7 03/07/2024     03/07/2024        Lab Results   Component Value Date     03/07/2024    K 4.3 03/07/2024     03/07/2024    CO2 22 03/07/2024     (H) 03/07/2024    BUN 18 03/07/2024    CREATININE 0.77 03/07/2024    CALCIUM 10.6 (H) 03/07/2024    PROT 7.1 06/16/2023    PROT 7.1 06/16/2023    ALBUMIN 4.4 03/07/2024    BILITOT 0.3 03/07/2024    ALKPHOS 106 06/16/2023    ALKPHOS 106 06/16/2023    AST 28 03/07/2024    ALT 27 03/07/2024    ANIONGAP 8 06/16/2023    ANIONGAP 8 06/16/2023    EGFRNORACEVR 86 03/07/2024       Lab Results   Component Value Date    SEDRATE 33 (H) 06/16/2023    SEDRATE 33 (H) 06/16/2023    CRP 25 (H) " "03/07/2024        No results found for: "QMSCYBYK09WX", "MAFKUGAX66"     Lab Results   Component Value Date    CHOL 194 06/16/2023    HDL 50 06/16/2023    LDLCALC 113.4 06/16/2023    TRIG 153 (H) 06/16/2023       Lab Results   Component Value Date    RF <14 03/18/2021     Lab Results   Component Value Date    ANASCREEN None Detected 06/16/2023    DSDNA <1 03/18/2021    SMRNPAB <1.0 NEG 03/23/2021    NBQ65TN <1.0 NEG 03/18/2021     No results found for: "HLABB27"    Infectious Disease Screening:  No results found for: "HEPBSAG", "HEPBCAB", "HEPBSAB", "HEPBSURFABQU", "HEPBIGM"  Lab Results   Component Value Date    HEPCAB NON-REACTIVE 10/16/2020     No results found for: "TBGOLDPLUS", "QUANTTBGDPL"  Lab Results   Component Value Date    QINT see note 03/18/2021        Imaging: DEXA, Xrays, MRIs, CTs, etc    Old & Outside Medical Records:  Reviewed old and all outside medical records available in Care Everywhere    Current Medication Changes:  Medication List with Changes/Refills   New Medications    CLOTRIMAZOLE (LOTRIMIN) 1 % CREAM    Apply topically 2 (two) times daily.    FLUCONAZOLE (DIFLUCAN) 150 MG TAB    Take 1 tablet (150 mg total) by mouth once daily.    PIROXICAM (FELDENE) 20 MG CAPSULE    Take 1 capsule (20 mg total) by mouth once daily.    TIRZEPATIDE (MOUNJARO) 2.5 MG/0.5 ML PNIJ    Inject 2.5 mg into the skin every 7 days.    TIRZEPATIDE (MOUNJARO) 5 MG/0.5 ML PNIJ    Inject 5 mg into the skin every 7 days.    TIRZEPATIDE 7.5 MG/0.5 ML PNIJ    Inject 7.5 mg into the skin every 7 days.   Current Medications    ALPRAZOLAM (XANAX) 0.25 MG TABLET    TAKE 1 TABLET BY MOUTH 4 TIMES A DAY AS NEEDED FOR ANXIETY    AMLODIPINE (NORVASC) 5 MG TABLET    Take 5 mg by mouth daily as needed.    ATORVASTATIN (LIPITOR) 10 MG TABLET    Take 1 tablet (10 mg total) by mouth once daily.    AZELASTINE (ASTELIN) 137 MCG (0.1 %) NASAL SPRAY    1 spray by Nasal route 2 (two) times daily as needed for Rhinitis.    BACLOFEN " (LIORESAL) 20 MG TABLET    TAKE 1 TABLET BY MOUTH THREE TIMES A DAY    CLOTRIMAZOLE (LOTRIMIN) 1 % CREAM    APPLY TO AFFECTED AREA TWICE A DAY    ESCITALOPRAM OXALATE (LEXAPRO) 5 MG TAB    TAKE 1 TABLET BY MOUTH EVERY DAY    ESTRADIOL (ESTRACE) 1 MG TABLET    Take 1 mg by mouth once daily.    FAMOTIDINE (PEPCID) 40 MG TABLET    Take 1 tablet (40 mg total) by mouth nightly as needed for Heartburn.    FLUCONAZOLE (DIFLUCAN) 150 MG TAB    Take 150 mg by mouth as needed.    HYDROXYZINE HCL (ATARAX) 25 MG TABLET    TAKE 1 TABLET (25 MG TOTAL) BY MOUTH EVERY EVENING.    KETOCONAZOLE (NIZORAL) 2 % CREAM    Apply 1 application topically 2 (two) times daily.    KETOCONAZOLE (NIZORAL) 2 % SHAMPOO    Apply topically twice a week.    LEVOCETIRIZINE (XYZAL) 5 MG TABLET    Take 1 tablet (5 mg total) by mouth 2 (two) times daily.    METOPROLOL SUCCINATE (TOPROL-XL) 25 MG 24 HR TABLET    Take 1 tablet (25 mg total) by mouth once daily.    MOMETASONE 0.1% (ELOCON) 0.1 % CREAM    Apply to affected area daily    NATURE-THROID 65 MG TAB    Take 1 tablet by mouth every morning.    OLMESARTAN-HYDROCHLOROTHIAZIDE (BENICAR HCT) 40-12.5 MG TAB    Take 1 tablet by mouth once daily.    OLOPATADINE (PATANOL) 0.1 % OPHTHALMIC SOLUTION    Place 1 drop into both eyes 2 (two) times daily.    OMEPRAZOLE (PRILOSEC) 20 MG CAPSULE    Take 1 capsule (20 mg total) by mouth 2 (two) times daily.    RIMEGEPANT (NURTEC) 75 MG ODT    Take 1 tablet (75 mg total) by mouth daily as needed for Migraine. Place ODT tablet on the tongue; alternatively the ODT tablet may be placed under the tongue    TIZANIDINE (ZANAFLEX) 4 MG TABLET    Half or full tablet by mouth at night as needed for muscle spasm   Changed and/or Refilled Medications    Modified Medication Previous Medication    BLOOD-GLUCOSE SENSOR (FREESTYLE AVIS 3 SENSOR) IVON blood-glucose sensor (FREESTYLE AVIS 3 SENSOR) Ivon       1 Device by Misc.(Non-Drug; Combo Route) route every 14 (fourteen) days.     1 Device by Misc.(Non-Drug; Combo Route) route every 14 (fourteen) days.   Discontinued Medications    SEMAGLUTIDE (OZEMPIC) 0.25 MG OR 0.5 MG(2 MG/1.5 ML) PEN INJECTOR    Inject 0.5 mg into the skin every 7 days.        Assessment:     Pt is a 64 y.o. female with psoriatic arthritis. The patient has not achieved clinical remission.    Plan:    Dennise was seen today for disease management.    Diagnoses and all orders for this visit:    Type 2 diabetes mellitus with other specified complication, unspecified whether long term insulin use  -     tirzepatide (MOUNJARO) 2.5 mg/0.5 mL PnIj; Inject 2.5 mg into the skin every 7 days.  -     tirzepatide (MOUNJARO) 5 mg/0.5 mL PnIj; Inject 5 mg into the skin every 7 days.  -     tirzepatide 7.5 mg/0.5 mL PnIj; Inject 7.5 mg into the skin every 7 days.  -     fluconazole (DIFLUCAN) 150 MG Tab; Take 1 tablet (150 mg total) by mouth once daily.  -     clotrimazole (LOTRIMIN) 1 % cream; Apply topically 2 (two) times daily.  -     Sedimentation rate; Future  -     Cancel: C-Reactive Protein; Future  -     Cancel: Comprehensive Metabolic Panel; Future  -     Cancel: CBC Auto Differential; Future  -     Cancel: Hemoglobin A1C; Future  -     C-Reactive Protein; Future  -     Comprehensive Metabolic Panel; Future  -     CBC Auto Differential; Future  -     Hemoglobin A1C; Future  -     Sedimentation rate, automated; Future  -     C-Reactive Protein  -     Comprehensive Metabolic Panel  -     CBC Auto Differential  -     Hemoglobin A1C  -     Sedimentation rate, automated    Thrush of mouth and esophagus  -     tirzepatide (MOUNJARO) 2.5 mg/0.5 mL PnIj; Inject 2.5 mg into the skin every 7 days.  -     tirzepatide (MOUNJARO) 5 mg/0.5 mL PnIj; Inject 5 mg into the skin every 7 days.  -     tirzepatide 7.5 mg/0.5 mL PnIj; Inject 7.5 mg into the skin every 7 days.  -     Sedimentation rate; Future  -     Cancel: C-Reactive Protein; Future  -     Cancel: Comprehensive Metabolic Panel;  Future  -     Cancel: CBC Auto Differential; Future  -     C-Reactive Protein; Future  -     Comprehensive Metabolic Panel; Future  -     CBC Auto Differential; Future  -     Hemoglobin A1C; Future  -     Sedimentation rate, automated; Future  -     C-Reactive Protein  -     Comprehensive Metabolic Panel  -     CBC Auto Differential  -     Hemoglobin A1C  -     Sedimentation rate, automated    Candidal dermatitis  -     tirzepatide (MOUNJARO) 2.5 mg/0.5 mL PnIj; Inject 2.5 mg into the skin every 7 days.  -     tirzepatide (MOUNJARO) 5 mg/0.5 mL PnIj; Inject 5 mg into the skin every 7 days.  -     tirzepatide 7.5 mg/0.5 mL PnIj; Inject 7.5 mg into the skin every 7 days.  -     Sedimentation rate; Future  -     Cancel: C-Reactive Protein; Future  -     Cancel: Comprehensive Metabolic Panel; Future  -     Cancel: CBC Auto Differential; Future  -     C-Reactive Protein; Future  -     Comprehensive Metabolic Panel; Future  -     CBC Auto Differential; Future  -     Hemoglobin A1C; Future  -     Sedimentation rate, automated; Future  -     C-Reactive Protein  -     Comprehensive Metabolic Panel  -     CBC Auto Differential  -     Hemoglobin A1C  -     Sedimentation rate, automated    PSA (psoriatic arthritis)  -     Sedimentation rate; Future  -     Cancel: C-Reactive Protein; Future  -     Cancel: Comprehensive Metabolic Panel; Future  -     Cancel: CBC Auto Differential; Future  -     piroxicam (FELDENE) 20 MG capsule; Take 1 capsule (20 mg total) by mouth once daily.  -     C-Reactive Protein; Future  -     Comprehensive Metabolic Panel; Future  -     CBC Auto Differential; Future  -     Hemoglobin A1C; Future  -     Sedimentation rate, automated; Future  -     C-Reactive Protein  -     Comprehensive Metabolic Panel  -     CBC Auto Differential  -     Hemoglobin A1C  -     Sedimentation rate, automated    Elevated C-reactive protein (CRP)  -     Sedimentation rate; Future  -     Cancel: C-Reactive Protein; Future  -      Cancel: Comprehensive Metabolic Panel; Future  -     Cancel: CBC Auto Differential; Future  -     C-Reactive Protein; Future  -     Comprehensive Metabolic Panel; Future  -     CBC Auto Differential; Future  -     Hemoglobin A1C; Future  -     Sedimentation rate, automated; Future  -     C-Reactive Protein  -     Comprehensive Metabolic Panel  -     CBC Auto Differential  -     Hemoglobin A1C  -     Sedimentation rate, automated    Type 2 diabetes mellitus without complication, without long-term current use of insulin  -     blood-glucose sensor (FREESTYLE AVIS 3 SENSOR) Monie; 1 Device by Misc.(Non-Drug; Combo Route) route every 14 (fourteen) days.  -     C-Reactive Protein; Future  -     Comprehensive Metabolic Panel; Future  -     CBC Auto Differential; Future  -     Hemoglobin A1C; Future  -     Sedimentation rate, automated; Future  -     C-Reactive Protein  -     Comprehensive Metabolic Panel  -     CBC Auto Differential  -     Hemoglobin A1C  -     Sedimentation rate, automated      Dc ozempic start mounjaro  Add feldene prn   H aic and esr, crp   F/u 6 month    More than 50% of the  42 minute encounter was spent face to face counseling the patient regarding current status and future plan of care as well as side effects  of the medications. All questions were answered to patient's satisfaction also includes  non-face to face time preparing to see the patient (eg, review of tests), Obtaining and/or reviewing separately obtained history, Documenting clinical information in the electronic or other health record, Independently interpreting results

## 2024-04-23 DIAGNOSIS — F41.9 ANXIETY: ICD-10-CM

## 2024-04-27 RX ORDER — ALPRAZOLAM 0.25 MG/1
0.25 TABLET ORAL 4 TIMES DAILY PRN
Qty: 28 TABLET | Refills: 0 | Status: SHIPPED | OUTPATIENT
Start: 2024-04-27 | End: 2024-05-04

## 2024-06-12 LAB
ALBUMIN SERPL-MCNC: 4.2 G/DL (ref 3.9–4.9)
ALBUMIN/GLOB SERPL: 1.9 {RATIO}
ALP SERPL-CCNC: 113 IU/L (ref 44–121)
ALT SERPL-CCNC: 18 IU/L (ref 0–32)
AST SERPL-CCNC: 18 IU/L (ref 0–40)
BASOPHILS # BLD AUTO: 0 X10E3/UL (ref 0–0.2)
BASOPHILS NFR BLD AUTO: 0 %
BILIRUB SERPL-MCNC: 0.2 MG/DL (ref 0–1.2)
BUN SERPL-MCNC: 17 MG/DL (ref 8–27)
BUN/CREAT SERPL: 20 (ref 12–28)
CALCIUM SERPL-MCNC: 9.9 MG/DL (ref 8.7–10.3)
CHLORIDE SERPL-SCNC: 105 MMOL/L (ref 96–106)
CO2 SERPL-SCNC: 19 MMOL/L (ref 20–29)
CREAT SERPL-MCNC: 0.86 MG/DL (ref 0.57–1)
CRP SERPL-MCNC: 21 MG/L (ref 0–10)
EOSINOPHIL # BLD AUTO: 0.4 X10E3/UL (ref 0–0.4)
EOSINOPHIL NFR BLD AUTO: 4 %
ERYTHROCYTE [DISTWIDTH] IN BLOOD BY AUTOMATED COUNT: 15.2 % (ref 11.7–15.4)
ERYTHROCYTE [SEDIMENTATION RATE] IN BLOOD BY WESTERGREN METHOD: 44 MM/HR (ref 0–40)
EST. GFR  (NO RACE VARIABLE): 75 ML/MIN/1.73
GLOBULIN SER CALC-MCNC: 2.2 G/DL (ref 1.5–4.5)
GLUCOSE SERPL-MCNC: 117 MG/DL (ref 70–99)
HBA1C MFR BLD: 6.6 % (ref 4.8–5.6)
HCT VFR BLD AUTO: 44 % (ref 34–46.6)
HGB BLD-MCNC: 14 G/DL (ref 11.1–15.9)
IMM GRANULOCYTES # BLD AUTO: 0 X10E3/UL (ref 0–0.1)
IMM GRANULOCYTES NFR BLD AUTO: 0 %
LYMPHOCYTES # BLD AUTO: 2.8 X10E3/UL (ref 0.7–3.1)
LYMPHOCYTES NFR BLD AUTO: 28 %
MCH RBC QN AUTO: 26 PG (ref 26.6–33)
MCHC RBC AUTO-ENTMCNC: 31.8 G/DL (ref 31.5–35.7)
MCV RBC AUTO: 82 FL (ref 79–97)
MONOCYTES # BLD AUTO: 0.5 X10E3/UL (ref 0.1–0.9)
MONOCYTES NFR BLD AUTO: 5 %
NEUTROPHILS # BLD AUTO: 6.3 X10E3/UL (ref 1.4–7)
NEUTROPHILS NFR BLD AUTO: 63 %
PLATELET # BLD AUTO: 372 X10E3/UL (ref 150–450)
POTASSIUM SERPL-SCNC: 4 MMOL/L (ref 3.5–5.2)
PROT SERPL-MCNC: 6.4 G/DL (ref 6–8.5)
RBC # BLD AUTO: 5.39 X10E6/UL (ref 3.77–5.28)
SODIUM SERPL-SCNC: 141 MMOL/L (ref 134–144)
WBC # BLD AUTO: 10.1 X10E3/UL (ref 3.4–10.8)

## 2024-06-28 DIAGNOSIS — L80 VITILIGO: ICD-10-CM

## 2024-06-28 DIAGNOSIS — L40.8 PSORIASIS WITH PUSTULES: ICD-10-CM

## 2024-06-28 DIAGNOSIS — M54.40 LOW BACK PAIN WITH SCIATICA, SCIATICA LATERALITY UNSPECIFIED, UNSPECIFIED BACK PAIN LATERALITY, UNSPECIFIED CHRONICITY: ICD-10-CM

## 2024-06-28 DIAGNOSIS — S00.96XA TICK BITE OF HEAD, INITIAL ENCOUNTER: ICD-10-CM

## 2024-06-28 DIAGNOSIS — L40.50 PSA (PSORIATIC ARTHRITIS): ICD-10-CM
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
## 2024-06-28 DIAGNOSIS — E11.51 DM (DIABETES MELLITUS) TYPE II, CONTROLLED, WITH PERIPHERAL VASCULAR DISORDER: ICD-10-CM

## 2024-07-01 RX ORDER — BACLOFEN 20 MG/1
TABLET ORAL
Qty: 270 TABLET | Refills: 3 | Status: SHIPPED | OUTPATIENT
Start: 2024-07-01

## 2024-07-11 PROBLEM — E66.01 SEVERE OBESITY (BMI 35.0-35.9 WITH COMORBIDITY): Status: ACTIVE | Noted: 2024-07-11

## 2024-07-26 DIAGNOSIS — L50.8 CHRONIC URTICARIA: ICD-10-CM

## 2024-07-31 DIAGNOSIS — E11.9 CONTROLLED TYPE 2 DIABETES MELLITUS WITHOUT COMPLICATION, WITHOUT LONG-TERM CURRENT USE OF INSULIN: Primary | ICD-10-CM

## 2024-07-31 NOTE — TELEPHONE ENCOUNTER
----- Message from Bri Salas sent at 7/31/2024  2:29 PM CDT -----  Contact: Patient  Type:  RX Refill Request    Who Called:   Patient  Refill or New Rx:  New    RX Name and Strength:  Mounjaro  7.5    Preferred Pharmacy with phone number:      [id number] - [place] [address] AT [facility] [address] [address]  Phone: [phone] Fax: [phone]    Local or Mail Order:  Local  Ordering Provider:  Dr Gonzalez    Would the patient rather a call back or a response via [facility]?   Call back  Best Call Back Number:  [phone]    Additional Information:   States she would like to speak with someone - states pharmacist told her they need a new prescription as this one got kicked out - please call - thank you

## 2024-08-06 RX ORDER — LEVOCETIRIZINE DIHYDROCHLORIDE 5 MG/1
5 TABLET, FILM COATED ORAL 2 TIMES DAILY
Qty: 180 TABLET | Refills: 1 | Status: SHIPPED | OUTPATIENT
Start: 2024-08-06

## 2024-08-28 ENCOUNTER — OFFICE VISIT (OUTPATIENT)
Dept: NEUROLOGY | Facility: CLINIC | Age: 64
End: 2024-08-28
Payer: OTHER GOVERNMENT

## 2024-08-28 VITALS
SYSTOLIC BLOOD PRESSURE: 122 MMHG | BODY MASS INDEX: 35.68 KG/M2 | WEIGHT: 214.19 LBS | DIASTOLIC BLOOD PRESSURE: 87 MMHG | HEIGHT: 65 IN | RESPIRATION RATE: 17 BRPM | TEMPERATURE: 97 F | HEART RATE: 87 BPM

## 2024-08-28 DIAGNOSIS — M79.18 CERVICAL MYOFASCIAL PAIN SYNDROME: ICD-10-CM

## 2024-08-28 DIAGNOSIS — F45.8 BRUXISM: ICD-10-CM

## 2024-08-28 DIAGNOSIS — G43.019 INTRACTABLE MIGRAINE WITHOUT AURA AND WITHOUT STATUS MIGRAINOSUS: ICD-10-CM

## 2024-08-28 PROCEDURE — 99999 PR PBB SHADOW E&M-EST. PATIENT-LVL V: CPT | Mod: PBBFAC,,, | Performed by: NURSE PRACTITIONER

## 2024-08-28 PROCEDURE — 99213 OFFICE O/P EST LOW 20 MIN: CPT | Mod: S$GLB,,, | Performed by: NURSE PRACTITIONER

## 2024-08-28 RX ORDER — TIZANIDINE 4 MG/1
TABLET ORAL
Qty: 90 TABLET | Refills: 3 | Status: SHIPPED | OUTPATIENT
Start: 2024-08-28

## 2024-08-28 RX ORDER — RIMEGEPANT SULFATE 75 MG/75MG
75 TABLET, ORALLY DISINTEGRATING ORAL DAILY PRN
Qty: 16 TABLET | Refills: 11 | Status: SHIPPED | OUTPATIENT
Start: 2024-08-28

## 2024-08-28 NOTE — ASSESSMENT & PLAN NOTE
Frequency increasing in recent months. Previously did well on Nurtec bridge. Will repeat and then use as needed. Close follow up in case prevention needed.

## 2024-08-28 NOTE — PROGRESS NOTES
Date of service: 8/28/2024  Referring provider: No ref. provider found    Subjective:      Chief complaint: Headache       Patient ID: Dennise Watkins is a 64 y.o. female with anxiety, chronic urticaria, DMII, HTN, hypothyroidism, psoriatic arthritis, SVT s/p ablation who presents for follow up of headache     History of Present Illness    INTERVAL HISTORY 8/28/24    Last visit was her initial evaluation a little over one year ago. At that time we did an MRI and started her on Nurtec. MRI was unremarkable for secondary headache cause.     Today she reports she is the same. Current pain 5 with range 1-9. She has a headache 6 days per week. Over the past 6 months frequency has been increasing. She takes Excedrin daily. Otherwise information below is reviewed and verified with no changes made     ORIGINAL HEADACHE HISTORY - 7/20/23  Age at onset and course over time: May 2023 began to have increased stress and increased frequency of migraines. Began in May as a daily headache. She has had headache free days since May.  No new medications and no illness, no vaccines.  No vision changes. No jaw claudication.   She has a remote history of rare migraines.    Family history of migraines - none  Last eye exam - 2 years ago    Location: forehead, top of head   Quality:  [] pressure [] tight [x] throbbing [] sharp [] stabbing   Severity: current 6 with range 1-8  Duration: hours  Frequency: daily  Headaches awaken at night?:  yes  Worst time of day: mid-day, evening   Associated with: [] photophobia []  phonophobia [] osmophobia [] blurred vision  [] double vision [] loss of appetite [x] nausea [] vomiting [] dizziness [] vertigo  [] tinnitus [] irritability [] sinus pressure [] problems with concentration   [x] neck tightness   Alleviated by:  [] sleep [] darkness [] massage [] heat [] ice [x] medication  Exacerbated by:  [x] fatigue [] light [] noise [] smells [] coughing [] sneezing  [] bending over [] ovulation [] menses []  alcohol [] change in weather [x]  stress  Ipsilateral autonomic: [] nasal congestion [] lacrimation [] ptosis [] injection [] edema [] foreign body sensation [] ear fullness   ICP:  [] transient visual obscurations  [x] tinnitus steady  [] positional headache  [x] non-positional   Sleep habits: trouble falling asleep, trouble staying asleep, unrefreshing sleep  Caffeine intake: 1 cup coffee   Gyn status (if female): total hysterectomy  HIT 6 - 62    Current acute treatment:  (Xanax)  Atarax  Excedrin migraine - reduced significantly but taking daily this week  Tramadol   Nurtec - effective     Current prevention:  Metoprolol  Olmesartan-HCTZ  Amlodipine - PRN    Previously tried/failed acute treatment:  Aleve - allergy  Celebrex  Flexeril    Previously tried/failed preventative treatment:  Celexa   Lexapro    Review of patient's allergies indicates:   Allergen Reactions    Sulfa (sulfonamide antibiotics) Hives    Aleve [naproxen sodium] Other (See Comments)     Nose bleed    Amoxicillin-pot clavulanate Diarrhea     Other reaction(s): diarrhea     Current Outpatient Medications   Medication Sig Dispense Refill    amlodipine (NORVASC) 5 MG tablet Take 5 mg by mouth daily as needed.      baclofen (LIORESAL) 20 MG tablet TAKE 1 TABLET BY MOUTH THREE TIMES A  tablet 3    blood-glucose sensor (FREESTYLE AVIS 3 SENSOR) Monie 1 Device by Misc.(Non-Drug; Combo Route) route every 14 (fourteen) days. 2 each 12    clotrimazole (LOTRIMIN) 1 % cream APPLY TO AFFECTED AREA TWICE A DAY 30 g 3    clotrimazole (LOTRIMIN) 1 % cream Apply topically 2 (two) times daily. 113 g 3    estradioL (ESTRACE) 1 MG tablet Take 1 mg by mouth once daily.      fluconazole (DIFLUCAN) 150 MG Tab Take 150 mg by mouth as needed.      ketoconazole (NIZORAL) 2 % cream Apply 1 application topically 2 (two) times daily. 30 g 1    ketoconazole (NIZORAL) 2 % shampoo Apply topically twice a week. 120 mL 3    levocetirizine (XYZAL) 5 MG tablet TAKE 1  TABLET BY MOUTH TWICE A  tablet 1    metoprolol succinate (TOPROL-XL) 25 MG 24 hr tablet TAKE 1 TABLET BY MOUTH EVERY DAY 90 tablet 1    mometasone 0.1% (ELOCON) 0.1 % cream Apply to affected area daily 45 g 1    olmesartan-hydrochlorothiazide (BENICAR HCT) 40-12.5 mg Tab TAKE 1 TABLET BY MOUTH EVERY DAY 90 tablet 1    omeprazole (PRILOSEC) 20 MG capsule TAKE 1 CAPSULE BY MOUTH TWICE A  capsule 1    piroxicam (FELDENE) 20 MG capsule Take 1 capsule (20 mg total) by mouth once daily. 90 capsule 3    tirzepatide (MOUNJARO) 5 mg/0.5 mL PnIj Inject 5 mg into the skin every 7 days.      tirzepatide 7.5 mg/0.5 mL PnIj Inject 7.5 mg into the skin every 7 days. 2 mL 5    ALPRAZolam (XANAX) 0.25 MG tablet Take 1 tablet (0.25 mg total) by mouth 4 (four) times daily as needed for Anxiety. 28 tablet 0    EScitalopram oxalate (LEXAPRO) 5 MG Tab TAKE 1 TABLET BY MOUTH EVERY DAY (Patient not taking: Reported on 7/11/2024) 90 tablet 1    famotidine (PEPCID) 40 MG tablet Take 1 tablet (40 mg total) by mouth nightly as needed for Heartburn. 30 tablet 5    hydrOXYzine HCl (ATARAX) 25 MG tablet TAKE 1 TABLET (25 MG TOTAL) BY MOUTH EVERY EVENING. (Patient not taking: Reported on 7/11/2024) 30 tablet 1    NATURE-THROID 65 mg Tab Take 1 tablet by mouth every morning. (Patient not taking: Reported on 8/28/2024) 90 tablet 3    olopatadine (PATANOL) 0.1 % ophthalmic solution Place 1 drop into both eyes 2 (two) times daily. 5 mL 3    rimegepant (NURTEC) 75 mg odt Take 1 tablet (75 mg total) by mouth daily as needed for Migraine. Place ODT tablet on the tongue; alternatively the ODT tablet may be placed under the tongue 16 tablet 11    tiZANidine (ZANAFLEX) 4 MG tablet Half or full tablet by mouth at night as needed for muscle spasm 90 tablet 3     No current facility-administered medications for this visit.       Past Medical History  Past Medical History:   Diagnosis Date    Abnormal blood chemistry     ADD (attention deficit  disorder)     Anemia     Anxiety disorder     Arthralgia     Breast mass, left     Diabetes mellitus     Dysuria     Epistaxis     Essential hypertension, benign     Fatigue     GERD (gastroesophageal reflux disease)     Hiatal hernia     History of chicken pox     Hyperglycemia     Hypertension     Hypokalemia     Neck pain     Paresthesia     Pruritic disorder     Thyroid disease     Weight gain        Past Surgical History  Past Surgical History:   Procedure Laterality Date    ABLATION N/A 9/28/2018    Procedure: Ablation;  Surgeon: Bunny Humphreys MD;  Location: [facility] CATH LAB;  Service: Cardiology;  Laterality: N/A;  SVT ,RFA, CHELE, MAC, SK ,3 Prep    ESOPHAGOGASTRODUODENOSCOPY      HYSTERECTOMY  2000    OOPHORECTOMY  2000    RHINOPLASTY TIP      TONSILLECTOMY, ADENOIDECTOMY         Family History  No family history on file.    Social History  Social History     Socioeconomic History    Marital status:    Tobacco Use    Smoking status: Never    Smokeless tobacco: Never   Substance and Sexual Activity    Alcohol use: No    Drug use: No    Sexual activity: Yes     Partners: Male          Objective:        Vitals:    08/28/24 1124   BP: 122/87   Pulse: 87   Resp: 17   Temp: 97 °F (36.1 °C)       Body mass index is 35.64 kg/m².    8/28/24  Constitutional:   She appears well-developed and well-nourished. She is well groomed     Neurological Exam:  General: well-developed, well-nourished, no distress  Mental status: Awake and alert  Speech language: No dysarthria or aphasia on conversation  Cranial nerves: Face symmetric  Motor: Moves all extremities well  Coordination: No ataxia. No tremor.      Data Review:     I have personally reviewed the referring provider's notes, labs, & imaging made available to me today.      RADIOLOGY STUDIES:  I have personally reviewed the pertinent images performed.       Results for orders placed or performed during the hospital encounter of 07/03/23   CT Head Without Contrast     Narrative    EXAMINATION:  CT HEAD WITHOUT CONTRAST    CLINICAL HISTORY:  Chronic headaches.    TECHNIQUE:  Axial CT images were obtained of the brain without intravenous contrast.  Coronal and sagittal reformations were obtained.  Automated exposure control utilized to reduce radiation dose.  Total exam DLP is 511 mGy cm.    COMPARISON:  None    FINDINGS:  Gray-white matter differentiation is within normal limits. There is chronic involutional change.  There is chronic white matter microischemic change.  There is intracranial atherosclerosis.  No acute intracranial hemorrhage, extra-axial fluid collection, hydrocephalus, mass effect, or midline shift is noted.  No large vessel territory acute ischemia is identified.  Visualized paranasal sinuses are clear.  Visualized mastoid air cells are clear.  No acute displaced calvarial fracture is identified.      Impression    1. No acute intracranial abnormalities identified.      Electronically signed by: Shamir Spivey MD  Date:    07/03/2023  Time:    12:00       Lab Results   Component Value Date     06/11/2024    K 4.0 06/11/2024    MG 2.1 09/11/2018     06/11/2024    CO2 19 (L) 06/11/2024    BUN 17 06/11/2024    CREATININE 0.86 06/11/2024     (H) 06/11/2024    HGBA1C 6.6 (H) 06/11/2024    HGBA1C 6.7 (H) 06/16/2023    HGBA1C 6.6 06/11/2020    AST 18 06/11/2024    ALT 18 06/11/2024    ALBUMIN 4.2 06/11/2024    ALBUMIN 4.4 06/16/2023    ALBUMIN 4.4 06/16/2023    PROT 7.1 06/16/2023    PROT 7.1 06/16/2023    BILITOT 0.2 06/11/2024    CHOL 193 07/17/2024    HDL 47 07/17/2024    LDLCALC 120 (H) 07/17/2024    TRIG 146 07/17/2024       Lab Results   Component Value Date    WBC 10.1 06/11/2024    HGB 14.0 06/11/2024    HCT 44.0 06/11/2024    MCV 82 06/11/2024     06/11/2024       Lab Results   Component Value Date    TSH 2.840 07/17/2024           Assessment & Plan:       Problem List Items Addressed This Visit          Neuro    Intractable migraine  without aura and without status migrainosus    Overview     Rare episodes and none in last 10 years. Headaches are typically unilateral, moderate to severe in intensity, worsen with activity, pounding in quality and associated with nausea.  Add magnesium and Nurtec.           Current Assessment & Plan     Frequency increasing in recent months. Previously did well on Nurtec bridge. Will repeat and then use as needed. Close follow up in case prevention needed.          Relevant Medications    rimegepant (NURTEC) 75 mg odt     Other Visit Diagnoses       Cervical myofascial pain syndrome        Relevant Medications    tiZANidine (ZANAFLEX) 4 MG tablet    Bruxism        Relevant Medications    tiZANidine (ZANAFLEX) 4 MG tablet                  Please call our clinic at [phone] or send a message on the [employer] portal if there are any changes to the plan described below, for example,if you are not contacted for the requested tests, referral(s) within one week, if you are unable to receive the medications prescribed, or if you feel you need to change the treatment course for any reason.     TESTING:  -- none     REFERRALS:  -- none at this time but can do a sleep study in the future    PREVENTION (use daily regardless of headache):  -- start magnesium in ONE of the following preparations -               1. Magnesium oxide 800mg daily (the most common over the counter kind, may causes loose stools)              2. Magnesium citrate 400-500mg daily (harder to find, but more neutral on the bowels)              3. Magnesium glycinate 400mg daily (hardest to find, look online, but most bowel-neutral, best absorbed)     AS-NEEDED TREATMENT (use total no more than 10 days per month unless otherwise stated):  -- START Nurtec once daily as needed. To break current cycle, take once daily for 3 days then every other day for a week. Then as needed after that  -- continue tizanidine at night. This is a muscle relaxer and it will  also make you potentially sleepy. Start with half a tablet to see how you respond but can take up to a whole tablet if needed. Do not take with baclofen and/or flexeril       Follow up in about 2 months (around 10/28/2024).      Violette Denney, NP

## 2024-08-28 NOTE — PATIENT INSTRUCTIONS
Please call our clinic at [phone] or send a message on the [employer] portal if there are any changes to the plan described below, for example,if you are not contacted for the requested tests, referral(s) within one week, if you are unable to receive the medications prescribed, or if you feel you need to change the treatment course for any reason.     TESTING:  -- none     REFERRALS:  -- none at this time but can do a sleep study in the future    PREVENTION (use daily regardless of headache):  -- start magnesium in ONE of the following preparations -               1. Magnesium oxide 800mg daily (the most common over the counter kind, may causes loose stools)              2. Magnesium citrate 400-500mg daily (harder to find, but more neutral on the bowels)              3. Magnesium glycinate 400mg daily (hardest to find, look online, but most bowel-neutral, best absorbed)     AS-NEEDED TREATMENT (use total no more than 10 days per month unless otherwise stated):  -- START Nurtec once daily as needed. To break current cycle, take once daily for 3 days then every other day for a week. Then as needed after that  -- continue tizanidine at night. This is a muscle relaxer and it will also make you potentially sleepy. Start with half a tablet to see how you respond but can take up to a whole tablet if needed. Do not take with baclofen and/or flexeril

## 2024-09-27 ENCOUNTER — TELEPHONE (OUTPATIENT)
Dept: NEUROLOGY | Facility: CLINIC | Age: 64
End: 2024-09-27

## 2024-09-27 ENCOUNTER — OFFICE VISIT (OUTPATIENT)
Dept: NEUROLOGY | Facility: CLINIC | Age: 64
End: 2024-09-27
Payer: OTHER GOVERNMENT

## 2024-09-27 VITALS
HEART RATE: 99 BPM | RESPIRATION RATE: 17 BRPM | DIASTOLIC BLOOD PRESSURE: 95 MMHG | BODY MASS INDEX: 35.35 KG/M2 | SYSTOLIC BLOOD PRESSURE: 139 MMHG | TEMPERATURE: 98 F | HEIGHT: 65 IN | WEIGHT: 212.19 LBS

## 2024-09-27 DIAGNOSIS — G43.019 INTRACTABLE MIGRAINE WITHOUT AURA AND WITHOUT STATUS MIGRAINOSUS: Primary | ICD-10-CM

## 2024-09-27 PROCEDURE — 99999 PR PBB SHADOW E&M-EST. PATIENT-LVL V: CPT | Mod: PBBFAC,,, | Performed by: NURSE PRACTITIONER

## 2024-09-27 NOTE — PROGRESS NOTES
Date of service: 9/27/2024  Referring provider: No ref. provider found    Subjective:      Chief complaint: Headache       Patient ID: Dennise Watkins is a 64 y.o. female with anxiety, chronic urticaria, DMII, HTN, hypothyroidism, psoriatic arthritis, SVT s/p ablation who presents for follow up of headache     History of Present Illness    INTERVAL HISTORY 9/27/24    Last visit was one month ago and at that time she was having 6 headache days per week.    Today she reports she is better. Current pain 2 with range 1-8. She has 2-3 headache days per week. She takes Excedrin 1-2 days per week. She has not been able to get Nurtec. Otherwise information below is reviewed and verified with no changes made     INTERVAL HISTORY 8/28/24    Last visit was her initial evaluation a little over one year ago. At that time we did an MRI and started her on Nurtec. MRI was unremarkable for secondary headache cause.     Today she reports she is the same. Current pain 5 with range 1-9. She has a headache 6 days per week. Over the past 6 months frequency has been increasing. She takes Excedrin daily. Otherwise information below is reviewed and verified with no changes made     ORIGINAL HEADACHE HISTORY - 7/20/23  Age at onset and course over time: May 2023 began to have increased stress and increased frequency of migraines. Began in May as a daily headache. She has had headache free days since May.  No new medications and no illness, no vaccines.  No vision changes. No jaw claudication.   She has a remote history of rare migraines.    Family history of migraines - none  Last eye exam - 2 years ago    Location: forehead, top of head   Quality:  [] pressure [] tight [x] throbbing [] sharp [] stabbing   Severity: current 6 with range 1-8  Duration: hours  Frequency: daily  Headaches awaken at night?:  yes  Worst time of day: mid-day, evening   Associated with: [] photophobia []  phonophobia [] osmophobia [] blurred vision  [] double vision  [] loss of appetite [x] nausea [] vomiting [] dizziness [] vertigo  [] tinnitus [] irritability [] sinus pressure [] problems with concentration   [x] neck tightness   Alleviated by:  [] sleep [] darkness [] massage [] heat [] ice [x] medication  Exacerbated by:  [x] fatigue [] light [] noise [] smells [] coughing [] sneezing  [] bending over [] ovulation [] menses [] alcohol [] change in weather [x]  stress  Ipsilateral autonomic: [] nasal congestion [] lacrimation [] ptosis [] injection [] edema [] foreign body sensation [] ear fullness   ICP:  [] transient visual obscurations  [x] tinnitus steady  [] positional headache  [x] non-positional   Sleep habits: trouble falling asleep, trouble staying asleep, unrefreshing sleep  Caffeine intake: 1 cup coffee   Gyn status (if female): total hysterectomy  HIT 6 -  62    Current acute treatment:  (Xanax)  Atarax  Excedrin migraine - reduced to 1-2 days per week  Tramadol  - rare  Nurtec - effective   Tizanidine    Current prevention:  Metoprolol  Olmesartan-HCTZ  Amlodipine - PRN    Previously tried/failed acute treatment:  Aleve - allergy  Celebrex  Flexeril    Previously tried/failed preventative treatment:  Celexa   Lexapro - has but not taking     Review of patient's allergies indicates:   Allergen Reactions    Sulfa (sulfonamide antibiotics) Hives    Aleve [naproxen sodium] Other (See Comments)     Nose bleed    Amoxicillin-pot clavulanate Diarrhea     Other reaction(s): diarrhea     Current Outpatient Medications   Medication Sig Dispense Refill    amlodipine (NORVASC) 5 MG tablet Take 5 mg by mouth daily as needed.      baclofen (LIORESAL) 20 MG tablet TAKE 1 TABLET BY MOUTH THREE TIMES A  tablet 3    blood-glucose sensor (FREESTYLE AVIS 3 SENSOR) Monie 1 Device by Misc.(Non-Drug; Combo Route) route every 14 (fourteen) days. 2 each 12    clotrimazole (LOTRIMIN) 1 % cream APPLY TO AFFECTED AREA TWICE A DAY 30 g 3    clotrimazole (LOTRIMIN) 1 % cream Apply  topically 2 (two) times daily. 113 g 3    estradioL (ESTRACE) 1 MG tablet Take 1 mg by mouth once daily.      fluconazole (DIFLUCAN) 150 MG Tab Take 150 mg by mouth as needed.      ketoconazole (NIZORAL) 2 % cream Apply 1 application topically 2 (two) times daily. 30 g 1    ketoconazole (NIZORAL) 2 % shampoo Apply topically twice a week. 120 mL 3    levocetirizine (XYZAL) 5 MG tablet TAKE 1 TABLET BY MOUTH TWICE A  tablet 1    metoprolol succinate (TOPROL-XL) 25 MG 24 hr tablet TAKE 1 TABLET BY MOUTH EVERY DAY 90 tablet 1    mometasone 0.1% (ELOCON) 0.1 % cream Apply to affected area daily 45 g 1    olmesartan-hydrochlorothiazide (BENICAR HCT) 40-12.5 mg Tab TAKE 1 TABLET BY MOUTH EVERY DAY 90 tablet 1    omeprazole (PRILOSEC) 20 MG capsule TAKE 1 CAPSULE BY MOUTH TWICE A  capsule 1    piroxicam (FELDENE) 20 MG capsule Take 1 capsule (20 mg total) by mouth once daily. 90 capsule 3    rimegepant (NURTEC) 75 mg odt Take 1 tablet (75 mg total) by mouth daily as needed for Migraine. Place ODT tablet on the tongue; alternatively the ODT tablet may be placed under the tongue 16 tablet 11    tirzepatide (MOUNJARO) 5 mg/0.5 mL PnIj Inject 5 mg into the skin every 7 days.      tirzepatide 7.5 mg/0.5 mL PnIj Inject 7.5 mg into the skin every 7 days. 2 mL 5    tiZANidine (ZANAFLEX) 4 MG tablet Half or full tablet by mouth at night as needed for muscle spasm 90 tablet 3    ALPRAZolam (XANAX) 0.25 MG tablet Take 1 tablet (0.25 mg total) by mouth 4 (four) times daily as needed for Anxiety. 28 tablet 0    EScitalopram oxalate (LEXAPRO) 5 MG Tab TAKE 1 TABLET BY MOUTH EVERY DAY (Patient not taking: Reported on 7/11/2024) 90 tablet 1    famotidine (PEPCID) 40 MG tablet Take 1 tablet (40 mg total) by mouth nightly as needed for Heartburn. 30 tablet 5    hydrOXYzine HCl (ATARAX) 25 MG tablet TAKE 1 TABLET (25 MG TOTAL) BY MOUTH EVERY EVENING. (Patient not taking: Reported on 7/11/2024) 30 tablet 1    NATURE-THROID 65 mg  Tab Take 1 tablet by mouth every morning. (Patient not taking: Reported on 8/28/2024) 90 tablet 3    olopatadine (PATANOL) 0.1 % ophthalmic solution Place 1 drop into both eyes 2 (two) times daily. 5 mL 3     No current facility-administered medications for this visit.       Past Medical History  Past Medical History:   Diagnosis Date    Abnormal blood chemistry     ADD (attention deficit disorder)     Anemia     Anxiety disorder     Arthralgia     Breast mass, left     Diabetes mellitus     Dysuria     Epistaxis     Essential hypertension, benign     Fatigue     GERD (gastroesophageal reflux disease)     Hiatal hernia     History of chicken pox     Hyperglycemia     Hypertension     Hypokalemia     Neck pain     Paresthesia     Pruritic disorder     Thyroid disease     Weight gain        Past Surgical History  Past Surgical History:   Procedure Laterality Date    ABLATION N/A 9/28/2018    Procedure: Ablation;  Surgeon: Bunny Humphreys MD;  Location: [facility] CATH LAB;  Service: Cardiology;  Laterality: N/A;  SVT ,RFA, CEHLE, MAC, SK ,3 Prep    ESOPHAGOGASTRODUODENOSCOPY      HYSTERECTOMY  2000    OOPHORECTOMY  2000    RHINOPLASTY TIP      TONSILLECTOMY, ADENOIDECTOMY         Family History  No family history on file.    Social History  Social History     Socioeconomic History    Marital status:    Tobacco Use    Smoking status: Never    Smokeless tobacco: Never   Substance and Sexual Activity    Alcohol use: No    Drug use: No    Sexual activity: Yes     Partners: Male          Objective:        Vitals:    09/27/24 1132   BP: (!) 139/95   Pulse: 99   Resp: 17   Temp: 97.5 °F (36.4 °C)         Body mass index is 35.31 kg/m².    9/27/24  Constitutional:   She appears well-developed and well-nourished. She is well groomed     Neurological Exam:  General: well-developed, well-nourished, no distress  Mental status: Awake and alert  Speech language: No dysarthria or aphasia on conversation  Cranial nerves: Face  symmetric  Motor: Moves all extremities well  Coordination: No ataxia. No tremor.      Data Review:     I have personally reviewed the referring provider's notes, labs, & imaging made available to me today.      RADIOLOGY STUDIES:  I have personally reviewed the pertinent images performed.       Results for orders placed or performed during the hospital encounter of 07/03/23   CT Head Without Contrast    Narrative    EXAMINATION:  CT HEAD WITHOUT CONTRAST    CLINICAL HISTORY:  Chronic headaches.    TECHNIQUE:  Axial CT images were obtained of the brain without intravenous contrast.  Coronal and sagittal reformations were obtained.  Automated exposure control utilized to reduce radiation dose.  Total exam DLP is 511 mGy cm.    COMPARISON:  None    FINDINGS:  Gray-white matter differentiation is within normal limits. There is chronic involutional change.  There is chronic white matter microischemic change.  There is intracranial atherosclerosis.  No acute intracranial hemorrhage, extra-axial fluid collection, hydrocephalus, mass effect, or midline shift is noted.  No large vessel territory acute ischemia is identified.  Visualized paranasal sinuses are clear.  Visualized mastoid air cells are clear.  No acute displaced calvarial fracture is identified.      Impression    1. No acute intracranial abnormalities identified.      Electronically signed by: Shamir Spivey MD  Date:    07/03/2023  Time:    12:00       Lab Results   Component Value Date     06/11/2024    K 4.0 06/11/2024    MG 2.1 09/11/2018     06/11/2024    CO2 19 (L) 06/11/2024    BUN 17 06/11/2024    CREATININE 0.86 06/11/2024     (H) 06/11/2024    HGBA1C 6.6 (H) 06/11/2024    HGBA1C 6.7 (H) 06/16/2023    HGBA1C 6.6 06/11/2020    AST 18 06/11/2024    ALT 18 06/11/2024    ALBUMIN 4.2 06/11/2024    ALBUMIN 4.4 06/16/2023    ALBUMIN 4.4 06/16/2023    PROT 7.1 06/16/2023    PROT 7.1 06/16/2023    BILITOT 0.2 06/11/2024    CHOL 193 07/17/2024     HDL 47 07/17/2024    LDLCALC 120 (H) 07/17/2024    TRIG 146 07/17/2024       Lab Results   Component Value Date    WBC 10.1 06/11/2024    HGB 14.0 06/11/2024    HCT 44.0 06/11/2024    MCV 82 06/11/2024     06/11/2024       Lab Results   Component Value Date    TSH 2.840 07/17/2024           Assessment & Plan:       Problem List Items Addressed This Visit          Neuro    Intractable migraine without aura and without status migrainosus - Primary    Overview     Rare episodes and none in last 10 years. Headaches are typically unilateral, moderate to severe in intensity, worsen with activity, pounding in quality and associated with nausea.  Add magnesium and Nurtec.           Current Assessment & Plan     Improving with stress reduction. Recommend taking Lexapro regularly. Will try to get Nurtec approved.                    Please call our clinic at [phone] or send a message on the [employer] portal if there are any changes to the plan described below, for example,if you are not contacted for the requested tests, referral(s) within one week, if you are unable to receive the medications prescribed, or if you feel you need to change the treatment course for any reason.     TESTING:  -- none     REFERRALS:  -- none at this time but can do a sleep study in the future    PREVENTION (use daily regardless of headache):  -- continue magnesium in ONE of the following preparations -               1. Magnesium oxide 800mg daily (the most common over the counter kind, may causes loose stools)              2. Magnesium citrate 400-500mg daily (harder to find, but more neutral on the bowels)              3. Magnesium glycinate 400mg daily (hardest to find, look online, but most bowel-neutral, best absorbed)   -- try taking Lexapro daily    AS-NEEDED TREATMENT (use total no more than 10 days per month unless otherwise stated):  -- START Nurtec once daily as needed. To break current cycle, take once daily for 3 days then  every other day for a week. Then as needed after that  -- continue tizanidine at night. This is a muscle relaxer and it will also make you potentially sleepy. Start with half a tablet to see how you respond but can take up to a whole tablet if needed. Do not take with baclofen and/or flexeril       No follow-ups on file.      Violette Denney, NP

## 2024-09-27 NOTE — ASSESSMENT & PLAN NOTE
Improving with stress reduction. Recommend taking Lexapro regularly. Will try to get [facility] approved.

## 2024-10-11 ENCOUNTER — OFFICE VISIT (OUTPATIENT)
Dept: RHEUMATOLOGY | Facility: CLINIC | Age: 64
End: 2024-10-11
Payer: OTHER GOVERNMENT

## 2024-10-11 VITALS
BODY MASS INDEX: 35.18 KG/M2 | SYSTOLIC BLOOD PRESSURE: 111 MMHG | HEIGHT: 65 IN | DIASTOLIC BLOOD PRESSURE: 78 MMHG | HEART RATE: 78 BPM | WEIGHT: 211.19 LBS

## 2024-10-11 DIAGNOSIS — L50.8 CHRONIC URTICARIA: ICD-10-CM

## 2024-10-11 DIAGNOSIS — E11.51 DM (DIABETES MELLITUS) TYPE II, CONTROLLED, WITH PERIPHERAL VASCULAR DISORDER: ICD-10-CM

## 2024-10-11 DIAGNOSIS — E11.9 CONTROLLED TYPE 2 DIABETES MELLITUS WITHOUT COMPLICATION, WITHOUT LONG-TERM CURRENT USE OF INSULIN: Primary | ICD-10-CM

## 2024-10-11 DIAGNOSIS — L40.8 PSORIASIS WITH PUSTULES: ICD-10-CM

## 2024-10-11 DIAGNOSIS — L40.50 PSA (PSORIATIC ARTHRITIS): ICD-10-CM

## 2024-10-11 PROCEDURE — 99999 PR PBB SHADOW E&M-EST. PATIENT-LVL V: CPT | Mod: PBBFAC,,, | Performed by: INTERNAL MEDICINE

## 2024-10-11 PROCEDURE — 96372 THER/PROPH/DIAG INJ SC/IM: CPT | Mod: S$GLB,,, | Performed by: INTERNAL MEDICINE

## 2024-10-11 PROCEDURE — 99215 OFFICE O/P EST HI 40 MIN: CPT | Mod: 25,S$GLB,, | Performed by: INTERNAL MEDICINE

## 2024-10-11 RX ORDER — CYANOCOBALAMIN 1000 UG/ML
1000 INJECTION, SOLUTION INTRAMUSCULAR; SUBCUTANEOUS
Status: COMPLETED | OUTPATIENT
Start: 2024-10-11 | End: 2024-10-11

## 2024-10-11 RX ORDER — KETOROLAC TROMETHAMINE 30 MG/ML
60 INJECTION, SOLUTION INTRAMUSCULAR; INTRAVENOUS
Status: COMPLETED | OUTPATIENT
Start: 2024-10-11 | End: 2024-10-11

## 2024-10-11 RX ADMIN — CYANOCOBALAMIN 1000 MCG: 1000 INJECTION, SOLUTION INTRAMUSCULAR; SUBCUTANEOUS at 11:10

## 2024-10-11 RX ADMIN — KETOROLAC TROMETHAMINE 60 MG: 30 INJECTION, SOLUTION INTRAMUSCULAR; INTRAVENOUS at 11:10

## 2024-10-11 ASSESSMENT — ROUTINE ASSESSMENT OF PATIENT INDEX DATA (RAPID3)
MDHAQ FUNCTION SCORE: 0.9
PSYCHOLOGICAL DISTRESS SCORE: 2.2
PATIENT GLOBAL ASSESSMENT SCORE: 4
PAIN SCORE: 5.5
TOTAL RAPID3 SCORE: 4.17

## 2024-10-11 NOTE — PROGRESS NOTES
Subjective:     Patient ID:  Dennise Watkins    Chief Complaint:  Disease Management     History of Present Illness:  Pt is a 64 y.o. female Pt is a 64 y.o. female on psoriatic arthritis. Scalp psoriasis last 2 week she has more stiffness throughout her body that is worse in the middle of the night.  Psoriasis is minimal on the scalp.  Esr , crp elevated,  Patient complains of arthralgias and myalgias for which has been present for a few years. Pain is located in multiple joints, both shoulder(s), both elbow(s), both wrist(s), both MCP(s): 1st, 2nd, 3rd, 4th and 5th, both PIP(s): 1st, 2nd, 3rd, 4th and 5th, both DIP(s): 1st and 2nd, both hip(s), both knee(s) and both MTP(s): 1st, 2nd, 3rd, 4th and 5th, is described as aching, pulsating, shooting and throbbing, and is constant, moderate .  Associated symptoms include: crepitation, decreased range of motion, edema, effusion, tenderness and warmth.             Prior tx:  1. NSAIDs      Rheumatologic History:   - Diagnosis/es:  - Positive serologies:  - Infectious screening labs:  - Previous Treatments:  - Current Treatments:     Interval History:   Hospitalization since last office visit: No    Patient Active Problem List    Diagnosis Date Noted    Severe obesity (BMI 35.0-35.9 with comorbidity) 07/11/2024    Intractable migraine without aura and without status migrainosus 07/20/2023    Colon cancer screening 02/06/2023    Psoriatic arthritis 02/06/2023    Current mild episode of major depressive disorder without prior episode 02/06/2023    Menopause 08/27/2020    Controlled type 2 diabetes mellitus without complication, without long-term current use of insulin 08/03/2020    Chronic rhinitis 03/07/2019    Pollen-food allergy 03/07/2019    Chronic urticaria 03/07/2019    Hypothyroidism 03/07/2019    Simple chronic conjunctivitis of both eyes 03/07/2019    SVT (supraventricular tachycardia) 09/24/2018    Anxiety disorder 09/24/2018    Hypertension, well controlled  07/06/2016    Recurrent UTI 07/01/2016    DM (diabetes mellitus), type 2, uncontrolled 07/01/2016     Past Surgical History:   Procedure Laterality Date    ABLATION N/A 9/28/2018    Procedure: Ablation;  Surgeon: Bunny Humphreys MD;  Location: [facility] CATH LAB;  Service: Cardiology;  Laterality: N/A;  SVT ,RFA, CHELE, MAC, SK ,3 Prep    ESOPHAGOGASTRODUODENOSCOPY      HYSTERECTOMY  2000    OOPHORECTOMY  2000    RHINOPLASTY TIP      TONSILLECTOMY, ADENOIDECTOMY       Social History     Tobacco Use    Smoking status: Never    Smokeless tobacco: Never   Substance Use Topics    Alcohol use: No    Drug use: No     No family history on file.  Review of patient's allergies indicates:   Allergen Reactions    Sulfa (sulfonamide antibiotics) Hives    Aleve [naproxen sodium] Other (See Comments)     Nose bleed    Amoxicillin-pot clavulanate Diarrhea     Other reaction(s): diarrhea       Review of Systems   Review of Systems   Constitutional:  Positive for chills and fatigue. Negative for activity change, appetite change, diaphoresis, fever and unexpected weight change.   HENT:  Negative for congestion, dental problem, ear discharge, ear pain, facial swelling, mouth sores, nosebleeds, postnasal drip, rhinorrhea, sinus pressure, sneezing, sore throat, tinnitus, trouble swallowing and voice change.    Eyes:  Negative for photophobia, pain, discharge, redness and itching.   Respiratory:  Positive for cough. Negative for apnea, chest tightness, shortness of breath and wheezing.    Cardiovascular:  Positive for leg swelling. Negative for chest pain and palpitations.   Gastrointestinal:  Positive for abdominal pain. Negative for abdominal distention, constipation, diarrhea, nausea and vomiting.   Endocrine: Negative for cold intolerance, heat intolerance, polydipsia and polyuria.   Genitourinary:  Negative for decreased urine volume, difficulty urinating, dysuria, flank pain, frequency, hematuria and urgency.   Musculoskeletal:  Positive  for arthralgias, back pain, gait problem, joint swelling, myalgias, neck pain and neck stiffness.   Skin:  Negative for pallor, rash and wound.   Allergic/Immunologic: Negative for immunocompromised state.   Neurological:  Negative for dizziness, tremors, numbness and headaches.   Hematological:  Negative for adenopathy. Does not bruise/bleed easily.   Psychiatric/Behavioral:  Negative for sleep disturbance. The patient is not nervous/anxious.         Current Medications:  Current Outpatient Medications   Medication Instructions    ALPRAZolam (XANAX) 0.25 mg, Oral, 4 times daily PRN    amLODIPine (NORVASC) 5 mg, Daily PRN    baclofen (LIORESAL) 20 MG tablet TAKE 1 TABLET BY MOUTH THREE TIMES A DAY    blood-glucose sensor ([employer] Interactive SoftwareSTYLE AVIS 3 SENSOR) Monie 1 Device, Misc.(Non-Drug; Combo Route), Every 14 days    clotrimazole (LOTRIMIN) 1 % cream APPLY TO AFFECTED AREA TWICE A DAY    clotrimazole (LOTRIMIN) 1 % cream Topical (Top), 2 times daily    EScitalopram oxalate (LEXAPRO) 5 MG Tab TAKE 1 TABLET BY MOUTH EVERY DAY    estradioL (ESTRACE) 1 mg, Daily    famotidine (PEPCID) 40 mg, Oral, Nightly PRN    fluconazole (DIFLUCAN) 150 mg, As needed (PRN)    hydrOXYzine HCL (ATARAX) 25 mg, Oral, Nightly    ketoconazole (NIZORAL) 2 % cream 1 application , Topical (Top), 2 times daily    ketoconazole (NIZORAL) 2 % shampoo Topical (Top), Twice weekly    levocetirizine (XYZAL) 5 mg, Oral, 2 times daily    metoprolol succinate (TOPROL-XL) 25 mg, Oral    mometasone 0.1% (ELOCON) 0.1 % cream Apply to affected area daily    MOUNJARO 5 mg, Every 7 days    NATURE-THROID 65 mg Tab 1 tablet, Oral, Every morning    NURTEC 75 mg, Oral, Daily PRN, Place ODT tablet on the tongue; alternatively the ODT tablet may be placed under the tongue    olmesartan-hydrochlorothiazide (BENICAR HCT) 40-12.5 mg Tab 1 tablet, Oral    olopatadine (PATANOL) 0.1 % ophthalmic solution 1 drop, Both Eyes, 2 times daily    omeprazole (PRILOSEC) 20 mg, Oral, 2  "times daily    piroxicam (FELDENE) 20 mg, Oral, Daily    tirzepatide 7.5 mg, Subcutaneous, Every 7 days    tirzepatide 10 mg, Subcutaneous, Every 7 days    tiZANidine (ZANAFLEX) 4 MG tablet Half or full tablet by mouth at night as needed for muscle spasm         Objective:     Vitals:    10/11/24 0930   BP: 111/78   Pulse: 78   Weight: 95.8 kg (211 lb 3.2 oz)   Height: 5' 5" (1.651 m)   PainSc:   6      Body mass index is 35.15 kg/m².     Physical Examinations:  Physical Exam   Constitutional: She is oriented to person, place, and time. No distress.   HENT:   Head: Normocephalic and atraumatic.   Eyes: Pupils are equal, round, and reactive to light. Right eye exhibits no discharge. Left eye exhibits no discharge.   Neck: No thyromegaly present.   Cardiovascular: Normal rate, regular rhythm and normal heart sounds. Exam reveals no gallop and no friction rub.   No murmur heard.  Pulmonary/Chest: Breath sounds normal. She has no wheezes. She has no rales. She exhibits no tenderness.   Abdominal: There is no abdominal tenderness. There is no rebound and no guarding.   Musculoskeletal:         General: Tenderness and deformity present.      Right shoulder: Tenderness present.      Left shoulder: Tenderness present.      Right elbow: Normal.      Left elbow: Tenderness present.      Right wrist: Tenderness present.      Left wrist: Tenderness present.      Cervical back: Neck supple.      Right knee: Effusion present. Tenderness present.      Left knee: Effusion present. Tenderness present.   Lymphadenopathy:     She has no cervical adenopathy.   Neurological: She is alert and oriented to person, place, and time. Gait normal.   Skin: Skin is dry. No rash noted. No erythema. There is pallor.   Psychiatric: Mood and affect normal.   Vitals reviewed.      Right Side Rheumatological Exam     Examination finds the elbow normal.    The patient is tender to palpation of the shoulder, wrist, knee, 1st PIP, 1st MCP, 2nd PIP, 2nd " MCP, 3rd PIP, 3rd MCP, 4th PIP, 4th MCP, 5th PIP and 5th MCP    She has swelling of the 1st PIP, 1st MCP, 2nd PIP, 2nd MCP, 3rd PIP, 3rd MCP, 4th PIP, 4th MCP, 5th PIP and 5th MCP    Shoulder Exam   Tenderness Location: no tenderness    Range of Motion   Active abduction:  abnormal   Adduction: abnormal  Sensation: normal    Knee Exam   Patellofemoral Crepitus: positive  Effusion: positive  Sensation: normal    Hip Exam   Tenderness Location: posterior  Sensation: normal    Elbow/Wrist Exam   Tenderness Location: no tenderness  Sensation: normal    Left Side Rheumatological Exam     The patient is tender to palpation of the shoulder, elbow, wrist, knee, 1st PIP, 1st MCP, 2nd PIP, 2nd MCP, 3rd PIP, 3rd MCP, 4th PIP, 4th MCP, 5th PIP and 5th MCP.    She has swelling of the 1st PIP, 1st MCP, 2nd PIP, 2nd MCP, 3rd PIP, 3rd MCP, 4th PIP, 4th MCP, 5th PIP and 5th MCP    Shoulder Exam   Tenderness Location: no tenderness    Range of Motion   Active abduction:  abnormal   Sensation: normal    Knee Exam     Patellofemoral Crepitus: positive  Effusion: positive  Sensation: normal    Hip Exam   Tenderness Location: posterior  Sensation: normal    Elbow/Wrist Exam   Sensation: normal      Back/Neck Exam   General Inspection   Gait: normal            Disease Assessment Scores:  Patient's Global Assessment of arthritis (0-10): 3  Physician's Global Assessment of arthritis (0-10): 3  Number of Tender Joints (0-28): 2  Number of Swollen Joints (0-28): 2         No data to display                Monitoring Lab Results:  Lab Results   Component Value Date    WBC 10.1 06/11/2024    RBC 5.39 (H) 06/11/2024    HGB 14.0 06/11/2024    HCT 44.0 06/11/2024    MCV 82 06/11/2024    MCH 26.0 (L) 06/11/2024    MCHC 31.8 06/11/2024    RDW 15.2 06/11/2024     06/11/2024        Lab Results   Component Value Date     06/11/2024    K 4.0 06/11/2024     06/11/2024    CO2 19 (L) 06/11/2024     (H) 06/11/2024    BUN 17  "06/11/2024    CREATININE 0.86 06/11/2024    CALCIUM 9.9 06/11/2024    PROT 7.1 06/16/2023    PROT 7.1 06/16/2023    ALBUMIN 4.2 06/11/2024    BILITOT 0.2 06/11/2024    ALKPHOS 106 06/16/2023    ALKPHOS 106 06/16/2023    AST 18 06/11/2024    ALT 18 06/11/2024    ANIONGAP 8 06/16/2023    ANIONGAP 8 06/16/2023    EGFRNORACEVR 75 06/11/2024       Lab Results   Component Value Date    SEDRATE 44 (H) 06/11/2024    CRP 21 (H) 06/11/2024        No results found for: "IISOOUCJ74VA", "ZSIDCYZN32"     Lab Results   Component Value Date    CHOL 193 07/17/2024    HDL 47 07/17/2024    LDLCALC 120 (H) 07/17/2024    TRIG 146 07/17/2024       Lab Results   Component Value Date    RF <14 03/18/2021     Lab Results   Component Value Date    ANASCREEN None Detected 06/16/2023    DSDNA <1 03/18/2021    SMRNPAB <1.0 NEG 03/23/2021    GCE76YC <1.0 NEG 03/18/2021     No results found for: "HLABB27"    Infectious Disease Screening:  No results found for: "HEPBSAG", "HEPBCAB", "HEPBSAB", "HEPBSURFABQU", "HEPBIGM"  Lab Results   Component Value Date    HEPCAB NON-REACTIVE 10/16/2020     No results found for: "TBGOLDPLUS", "QUANTTBGDPL"  Lab Results   Component Value Date    QINT see note 03/18/2021      Collected Updated Procedure    07/17/2024 0949 07/19/2024 0610 Microalbumin/Creatinine Ratio, Urine [[id number]]  Component Value Units   Creatinine, Urine 59.9 mg/dL   Microalb, Ur <3.0 ug/mL   Microalb/Crt. Ratio <5  mg/g creat          06/11/2024 0740 06/12/2024 1513 Sedimentation rate, automated [[id number]]    (Abnormal)   Blood    Component Value Units   Sed Rate 44 High  mm/hr          06/11/2024 0740 06/12/2024 1513 Hemoglobin A1C [[id number]]    (Abnormal)   Blood    Component Value Units   Hemoglobin A1c 6.6 High   %          06/11/2024 0740 06/12/2024 1513 CBC Auto Differential [[id number]]    (Abnormal)   Blood    Component Value Units   WBC 10.1 x10E3/uL   RBC 5.39 High  x10E6/uL   Hemoglobin 14.0 g/dL   Hematocrit 44.0 %   MCV 82 " fL   MCH 26.0 Low  pg   MCHC 31.8 g/dL   RDW 15.2 %   Platelets 372 x10E3/uL   Neutrophils 63 %   Lymphs 28 %   Monocytes 5 %   Eos 4 %   Basos 0 %   Neutrophils (Absolute) 6.3 x10E3/uL   Lymphs (Absolute) 2.8 x10E3/uL   Monocytes(Absolute) 0.5 x10E3/uL   Eos (Absolute) 0.4 x10E3/uL   Baso (Absolute) 0.0 x10E3/uL   Immature Granulocytes 0 %   Immature Grans (Abs) 0.0 x10E3/uL          06/11/2024 0740 06/12/2024 1513 Comprehensive Metabolic Panel [[id number]]    (Abnormal)   Blood    Component Value Units   Glucose 117 High  mg/dL   BUN 17 mg/dL   Creatinine 0.86 mg/dL   eGFR 75 mL/min/1.73   BUN/Creatinine Ratio 20    Sodium 141 mmol/L   Potassium 4.0 mmol/L   Chloride 105 mmol/L   CO2 19 Low  mmol/L   Calcium 9.9 mg/dL   Protein, Total 6.4 g/dL   Albumin 4.2 g/dL   Globulin, Total 2.2 g/dL   Albumin/Globulin Ratio 1.9    Total Bilirubin 0.2 mg/dL   Alkaline Phosphatase 113 IU/L   AST 18 IU/L   ALT 18 IU/L          06/11/2024 0740 06/12/2024 1513 C-Reactive Protein [[id number]]    (Abnormal)   Blood    Component Value Units   CRP 21 High  mg/L            Imaging: DEXA, Xrays, MRIs, CTs, etc    Old & Outside Medical Records:  Reviewed old and all outside medical records available in Care Everywhere     Assessment:     Encounter Diagnoses   Name Primary?    Controlled type 2 diabetes mellitus without complication, without long-term current use of insulin Yes    PSA (psoriatic arthritis)     Psoriasis with pustules     DM (diabetes mellitus) type II, controlled, with peripheral vascular disorder     Chronic urticaria        Plan:      Encounter Diagnoses   Name Primary?    Controlled type 2 diabetes mellitus without complication, without long-term current use of insulin Yes    PSA (psoriatic arthritis)     Psoriasis with pustules     DM (diabetes mellitus) type II, controlled, with peripheral vascular disorder     Chronic urticaria      Dennise was seen today for disease management.    Diagnoses and all orders for this  visit:    Controlled type 2 diabetes mellitus without complication, without long-term current use of insulin  -     Sedimentation rate; Future  -     C-Reactive Protein; Future  -     Comprehensive Metabolic Panel; Future  -     CBC Auto Differential; Future  -     T4, Free; Future  -     TSH; Future  -     T3, Free; Future    PSA (psoriatic arthritis)  -     Sedimentation rate; Future  -     C-Reactive Protein; Future  -     Comprehensive Metabolic Panel; Future  -     CBC Auto Differential; Future  -     T4, Free; Future  -     TSH; Future  -     T3, Free; Future  -     ketorolac injection 60 mg  -     cyanocobalamin injection 1,000 mcg    Psoriasis with pustules  -     Sedimentation rate; Future  -     C-Reactive Protein; Future  -     Comprehensive Metabolic Panel; Future  -     CBC Auto Differential; Future  -     T4, Free; Future  -     TSH; Future  -     T3, Free; Future  -     ketorolac injection 60 mg  -     cyanocobalamin injection 1,000 mcg    DM (diabetes mellitus) type II, controlled, with peripheral vascular disorder  -     tirzepatide 10 mg/0.5 mL PnIj; Inject 10 mg into the skin every 7 days.  -     Sedimentation rate; Future  -     C-Reactive Protein; Future  -     Comprehensive Metabolic Panel; Future  -     CBC Auto Differential; Future  -     T4, Free; Future  -     TSH; Future  -     T3, Free; Future  -     Insulin, Random; Future  -     ketorolac injection 60 mg  -     cyanocobalamin injection 1,000 mcg    Chronic urticaria  -     ketorolac injection 60 mg  -     cyanocobalamin injection 1,000 mcg       1. Increase mounjaro  2. Nurse injection  3. F/u 6 month     Follow-up 6 months    More than 50% of the  53 minute encounter was spent face to face counseling the patient regarding current status and future plan of care as well as side effects  of the medications. All questions were answered to patient's satisfaction also includes  non-face to face time preparing to see the patient (eg, review  of tests), Obtaining and/or reviewing separately obtained history, Documenting clinical information in the electronic or other health record, Independently interpreting results

## 2024-10-31 ENCOUNTER — CLINICAL SUPPORT (OUTPATIENT)
Dept: RHEUMATOLOGY | Facility: CLINIC | Age: 64
End: 2024-10-31
Payer: OTHER GOVERNMENT

## 2024-10-31 VITALS — SYSTOLIC BLOOD PRESSURE: 129 MMHG | DIASTOLIC BLOOD PRESSURE: 88 MMHG | HEART RATE: 95 BPM

## 2024-10-31 DIAGNOSIS — L40.50 PSA (PSORIATIC ARTHRITIS): Primary | ICD-10-CM

## 2024-10-31 DIAGNOSIS — R53.83 FATIGUE, UNSPECIFIED TYPE: ICD-10-CM

## 2024-10-31 PROCEDURE — 99999 PR PBB SHADOW E&M-EST. PATIENT-LVL IV: CPT | Mod: PBBFAC,,,

## 2024-10-31 RX ORDER — KETOROLAC TROMETHAMINE 30 MG/ML
60 INJECTION, SOLUTION INTRAMUSCULAR; INTRAVENOUS
Status: COMPLETED | OUTPATIENT
Start: 2024-10-31 | End: 2024-10-31

## 2024-10-31 RX ORDER — CYANOCOBALAMIN 1000 UG/ML
1000 INJECTION, SOLUTION INTRAMUSCULAR; SUBCUTANEOUS
Status: COMPLETED | OUTPATIENT
Start: 2024-10-31 | End: 2024-10-31

## 2024-10-31 RX ADMIN — KETOROLAC TROMETHAMINE 60 MG: 30 INJECTION, SOLUTION INTRAMUSCULAR; INTRAVENOUS at 10:10

## 2024-10-31 RX ADMIN — CYANOCOBALAMIN 1000 MCG: 1000 INJECTION, SOLUTION INTRAMUSCULAR; SUBCUTANEOUS at 10:10

## 2025-01-07 DIAGNOSIS — E11.51 DM (DIABETES MELLITUS) TYPE II, CONTROLLED, WITH PERIPHERAL VASCULAR DISORDER: ICD-10-CM

## 2025-01-08 RX ORDER — TIRZEPATIDE 5 MG/.5ML
5 INJECTION, SOLUTION SUBCUTANEOUS
Qty: 2 ML | Refills: 11 | Status: SHIPPED | OUTPATIENT
Start: 2025-01-08 | End: 2025-01-08

## 2025-01-08 RX ORDER — TIRZEPATIDE 5 MG/.5ML
5 INJECTION, SOLUTION SUBCUTANEOUS
Qty: 2 ML | Refills: 11 | Status: SHIPPED | OUTPATIENT
Start: 2025-01-08 | End: 2026-01-08

## 2025-01-10 DIAGNOSIS — E11.9 TYPE 2 DIABETES MELLITUS WITHOUT COMPLICATION, WITHOUT LONG-TERM CURRENT USE OF INSULIN: ICD-10-CM

## 2025-01-10 DIAGNOSIS — E11.51 DM (DIABETES MELLITUS) TYPE II, CONTROLLED, WITH PERIPHERAL VASCULAR DISORDER: ICD-10-CM

## 2025-01-10 DIAGNOSIS — B36.0 TINEA VERSICOLOR: ICD-10-CM

## 2025-01-12 RX ORDER — CLOTRIMAZOLE 1 %
CREAM (GRAM) TOPICAL
Qty: 30 G | Refills: 3 | Status: SHIPPED | OUTPATIENT
Start: 2025-01-12 | End: 2025-01-14

## 2025-01-12 RX ORDER — TIRZEPATIDE 5 MG/.5ML
5 INJECTION, SOLUTION SUBCUTANEOUS
Qty: 2 ML | Refills: 11 | Status: SHIPPED | OUTPATIENT
Start: 2025-01-12 | End: 2025-01-15 | Stop reason: DRUGHIGH

## 2025-01-12 RX ORDER — BLOOD-GLUCOSE SENSOR
1 EACH MISCELLANEOUS
Qty: 2 EACH | Refills: 12 | Status: SHIPPED | OUTPATIENT
Start: 2025-01-12 | End: 2025-01-14

## 2025-01-13 DIAGNOSIS — B36.0 TINEA VERSICOLOR: ICD-10-CM

## 2025-01-14 ENCOUNTER — TELEPHONE (OUTPATIENT)
Dept: RHEUMATOLOGY | Facility: CLINIC | Age: 65
End: 2025-01-14
Payer: COMMERCIAL

## 2025-01-14 ENCOUNTER — PATIENT MESSAGE (OUTPATIENT)
Dept: RHEUMATOLOGY | Facility: CLINIC | Age: 65
End: 2025-01-14
Payer: COMMERCIAL

## 2025-01-14 RX ORDER — KETOCONAZOLE 20 MG/G
CREAM TOPICAL DAILY
Qty: 30 G | Refills: 3 | Status: SHIPPED | OUTPATIENT
Start: 2025-01-14

## 2025-01-14 NOTE — TELEPHONE ENCOUNTER
Discussed with Dr. Gonzalez. Patient is following with her PCP for diabetes management. Recommend that patient discuss Dexcom or CGM further with her PCP. Also recommend that patient discuss with her PCP about taking over prescribing Mounjaro for her diabetes. Cancelled rx for freestyle mp    Staff,  Please let patient know the above. Thanks!

## 2025-01-14 NOTE — TELEPHONE ENCOUNTER
----- Message from Les Romero sent at 1/13/2025  8:37 AM CST -----  Regarding: Formulary  Good morning,    Insurance is asking that the patient try Dexcom. Is this possible?, if so, will you send over a new script fir the patients Dexcom?    Heather Del Toro CPhT

## 2025-01-15 ENCOUNTER — OFFICE VISIT (OUTPATIENT)
Dept: ENDOCRINOLOGY | Facility: CLINIC | Age: 65
End: 2025-01-15
Payer: COMMERCIAL

## 2025-01-15 VITALS
BODY MASS INDEX: 36.36 KG/M2 | WEIGHT: 218.25 LBS | HEIGHT: 65 IN | HEART RATE: 67 BPM | OXYGEN SATURATION: 98 % | SYSTOLIC BLOOD PRESSURE: 130 MMHG | DIASTOLIC BLOOD PRESSURE: 76 MMHG

## 2025-01-15 DIAGNOSIS — E11.9 TYPE 2 DIABETES MELLITUS WITHOUT COMPLICATION, WITHOUT LONG-TERM CURRENT USE OF INSULIN: Primary | ICD-10-CM

## 2025-01-15 DIAGNOSIS — E03.9 ACQUIRED HYPOTHYROIDISM: ICD-10-CM

## 2025-01-15 DIAGNOSIS — E11.9 CONTROLLED TYPE 2 DIABETES MELLITUS WITHOUT COMPLICATION, WITHOUT LONG-TERM CURRENT USE OF INSULIN: ICD-10-CM

## 2025-01-15 DIAGNOSIS — I10 HYPERTENSION, WELL CONTROLLED: ICD-10-CM

## 2025-01-15 PROCEDURE — 99214 OFFICE O/P EST MOD 30 MIN: CPT | Mod: S$GLB,,, | Performed by: NURSE PRACTITIONER

## 2025-01-15 PROCEDURE — 99999 PR PBB SHADOW E&M-EST. PATIENT-LVL IV: CPT | Mod: PBBFAC,,, | Performed by: NURSE PRACTITIONER

## 2025-01-15 PROCEDURE — 3075F SYST BP GE 130 - 139MM HG: CPT | Mod: CPTII,S$GLB,, | Performed by: NURSE PRACTITIONER

## 2025-01-15 PROCEDURE — 3288F FALL RISK ASSESSMENT DOCD: CPT | Mod: CPTII,S$GLB,, | Performed by: NURSE PRACTITIONER

## 2025-01-15 PROCEDURE — 1159F MED LIST DOCD IN RCRD: CPT | Mod: CPTII,S$GLB,, | Performed by: NURSE PRACTITIONER

## 2025-01-15 PROCEDURE — 1101F PT FALLS ASSESS-DOCD LE1/YR: CPT | Mod: CPTII,S$GLB,, | Performed by: NURSE PRACTITIONER

## 2025-01-15 PROCEDURE — 3008F BODY MASS INDEX DOCD: CPT | Mod: CPTII,S$GLB,, | Performed by: NURSE PRACTITIONER

## 2025-01-15 PROCEDURE — 3078F DIAST BP <80 MM HG: CPT | Mod: CPTII,S$GLB,, | Performed by: NURSE PRACTITIONER

## 2025-01-15 RX ORDER — TIRZEPATIDE 5 MG/.5ML
5 INJECTION, SOLUTION SUBCUTANEOUS
Qty: 4 PEN | Refills: 3 | Status: SHIPPED | OUTPATIENT
Start: 2025-02-13

## 2025-01-15 RX ORDER — TIRZEPATIDE 2.5 MG/.5ML
2.5 INJECTION, SOLUTION SUBCUTANEOUS
Qty: 4 PEN | Refills: 0 | Status: SHIPPED | OUTPATIENT
Start: 2025-01-15

## 2025-01-15 NOTE — PROGRESS NOTES
HTNSubjective:       Patient ID: Dennise Watkins is a 65 y.o. female.    Chief Complaint: Diabetes     HPI Pt is a 65 y.o. wf  with a diagnosis of Type 2 diabetes mellitus diagnosed approximately 2020 preceding borderline since 2010, , as well as chronic conditions pending review including HTN, hypothyroidism .  Other pertinent medical and social information noted includes, but not limited to: N?A .     Interim Events:Lang 15, 2025:  Hadn't seen in 2 years.  Dr. Gonzalez switched her to Mounjaro for better wt loss to help improve overall inflammation and then unable to get refills so she returned.  Has been off x 2 months and partially r/t stomach virus after returning from trip.  No focal complaints or acute events. Now retired but remains on [employer].     Current DM meds:   mounjaro.        Failed DM meds:  none   Statin: atorvastatin 10 mg        Not tolerated statin : na   ACE/ARB:olmesartan 40 mg        Not tolerated ACE/ARB: na   Known Diabetic complications: na      march 16, 2023:  Pt restarted ozempic--currently tolerating 0.25 mg.  A1C excellent.  No wt loss though.  No focal complaints. Still working on adjusting to life without      Sept 13, 2022: Pt new to me-self referred.  Been borderline DM for years and finally crossed the border.  Was using Ozempic--worked well--but she mentioned she was having some GERD so provider would not refill.Pt states she was finally starting to tolerate when it was stopped.  No focal complaints.  Unfortunately lost her very healthy  to Covid 1 yr ago, so trying to get back on track with her new life.       Review of Systems   Constitutional:  Negative for activity change and fatigue.   HENT:  Negative for hearing loss and trouble swallowing.    Eyes:  Negative for photophobia and visual disturbance.        Last Eye Exam:    Respiratory:  Negative for cough and shortness of breath.    Cardiovascular:  Negative for chest pain and palpitations.  "  Gastrointestinal:  Negative for constipation and diarrhea.   Genitourinary:  Negative for frequency and urgency.   Musculoskeletal:  Negative for arthralgias and myalgias.   Integumentary:  Negative for rash and wound.   Neurological:  Negative for weakness and numbness.   Psychiatric/Behavioral:  Negative for sleep disturbance. The patient is not nervous/anxious.          Objective:      Physical Exam  Constitutional:       Appearance: Normal appearance.   HENT:      Head: Normocephalic and atraumatic.      Nose: Nose normal.   Eyes:      Extraocular Movements: Extraocular movements intact.      Conjunctiva/sclera: Conjunctivae normal.      Pupils: Pupils are equal, round, and reactive to light.   Cardiovascular:      Rate and Rhythm: Normal rate and regular rhythm.   Pulmonary:      Effort: Pulmonary effort is normal.      Breath sounds: Normal breath sounds.   Musculoskeletal:         General: Normal range of motion.      Cervical back: Normal range of motion and neck supple.      Right lower leg: No edema.      Left lower leg: No edema.      Comments: Feet:   Lymphadenopathy:      Cervical: No cervical adenopathy.   Skin:     General: Skin is warm and dry.   Neurological:      General: No focal deficit present.      Mental Status: She is alert and oriented to person, place, and time.   Psychiatric:         Mood and Affect: Mood normal.         Behavior: Behavior normal.         Thought Content: Thought content normal.         Judgment: Judgment normal.           /76   Pulse 67   Ht 5' 5" (1.651 m)   Wt 99 kg (218 lb 4.1 oz)   SpO2 98%   BMI 36.32 kg/m²     Hemoglobin A1C   Date Value Ref Range Status   06/16/2023 6.7 (H) 0.0 - 5.6 % Final     Comment:     Reference Interval:  5.0 - 5.6 Normal   5.7 - 6.4 High Risk   > 6.5 Diabetic      Hgb A1c results are standardized based on the (NGSP) National   Glycohemoglobin Standardization Program.      Hemoglobin A1C levels are related to mean serum/plasma " glucose   during the preceding 2-3 months.        01/13/2023 6.5 (H) 0.0 - 5.6 % Final     Comment:     Reference Interval:  5.0 - 5.6 Normal   5.7 - 6.4 High Risk   > 6.5 Diabetic      Hgb A1c results are standardized based on the (NGSP) National   Glycohemoglobin Standardization Program.      Hemoglobin A1C levels are related to mean serum/plasma glucose   during the preceding 2-3 months.        09/12/2022 6.8 (H) 0.0 - 5.6 % Final     Comment:     Reference Interval:  5.0 - 5.6 Normal   5.7 - 6.4 High Risk   > 6.5 Diabetic      Hgb A1c results are standardized based on the (NGSP) National   Glycohemoglobin Standardization Program.      Hemoglobin A1C levels are related to mean serum/plasma glucose   during the preceding 2-3 months.        06/11/2020 6.6 % Final     Comment:     ordered by Derrick Harris [place]     Hemoglobin A1c   Date Value Ref Range Status   06/11/2024 6.6 (H) 4.8 - 5.6 % Final     Comment:              Prediabetes: 5.7 - 6.4           Diabetes: >6.4           Glycemic control for adults with diabetes: <7.0     03/07/2024 7.2 (H) 4.8 - 5.6 % Final     Comment:              Prediabetes: 5.7 - 6.4           Diabetes: >6.4           Glycemic control for adults with diabetes: <7.0         Chemistry        Component Value Date/Time     06/11/2024 0740    K 4.0 06/11/2024 0740     06/11/2024 0740    CO2 19 (L) 06/11/2024 0740    BUN 17 06/11/2024 0740    CREATININE 0.86 06/11/2024 0740     (H) 06/11/2024 0740        Component Value Date/Time    CALCIUM 9.9 06/11/2024 0740    ALKPHOS 106 06/16/2023 1155    ALKPHOS 106 06/16/2023 1155    AST 18 06/11/2024 0740    ALT 18 06/11/2024 0740    BILITOT 0.2 06/11/2024 0740    ESTGFRAFRICA 78 02/11/2022 0956    EGFRNONAA 68 02/11/2022 0956          Lab Results   Component Value Date    CHOL 193 07/17/2024     Lab Results   Component Value Date    HDL 47 07/17/2024     Lab Results   Component Value Date    LDLCALC 120 (H)  "07/17/2024     Lab Results   Component Value Date    TRIG 146 07/17/2024     Lab Results   Component Value Date    CHOLHDL 25.8 06/16/2023     Lab Results   Component Value Date    MICALBCREAT 3.5 06/16/2023     Lab Results   Component Value Date    TSH 2.840 07/17/2024     No results found for: "TUHJOCRE00VH"   Assessment:         1. Type 2 diabetes mellitus without complication, without long-term current use of insulin  tirzepatide (MOUNJARO) 2.5 mg/0.5 mL PnIj    tirzepatide (MOUNJARO) 5 mg/0.5 mL PnIj    Comprehensive Metabolic Panel    Hemoglobin A1C    Lipid Panel    Microalbumin/Creatinine Ratio, Urine    CANCELED: Comprehensive Metabolic Panel    CANCELED: Hemoglobin A1C    CANCELED: Lipid Panel    CANCELED: Microalbumin/Creatinine Ratio, Urine      2. Controlled type 2 diabetes mellitus without complication, without long-term current use of insulin        3. Hypertension, well controlled        4. Acquired hypothyroidism                Plan:       Restart mounjaro 2.5 mg, can increase to 5 mg 1 motnh   Cont atorvastatin  Cont olmesartan       ORDERS 01/15/2025     6 months with  fasting ----cmp, lipids, A1c, urine m /c  prior --pt will make appt.           "

## 2025-02-06 ENCOUNTER — TELEPHONE (OUTPATIENT)
Dept: ENDOCRINOLOGY | Facility: CLINIC | Age: 65
End: 2025-02-06
Payer: COMMERCIAL

## 2025-02-06 NOTE — TELEPHONE ENCOUNTER
----- Message from Liana sent at 2/6/2025 11:26 AM CST -----  Contact: pt  Type:  Pharmacy Calling to Clarify an RX    MULTIPLE CONTACTS about this from pharm and pt and insurance company about her diabetic meds    Name of Caller:pt    Pharmacy Name:     [id number] [place] [id number] AT [facility] [address]  [address] [address] [address]  Phone: [phone] Fax: [phone]    Prescription Name: 2 medications    tirzepatide (MOUNJARO) 2.5 mg/0.5 mL PnIj  tirzepatide (MOUNJARO) 5 mg/0.5 mL PnIj      What do they need to clarify?: needing PA submitted with Dx code    Best Call Back Number: [phone]    Additional Information: pt and pharm and insurance co have been trying to get office to submit PA for almost a month with no response    Please submit and call pt to advise    Thanks

## 2025-05-01 ENCOUNTER — OFFICE VISIT (OUTPATIENT)
Dept: RHEUMATOLOGY | Facility: CLINIC | Age: 65
End: 2025-05-01
Payer: MEDICARE

## 2025-05-01 VITALS
BODY MASS INDEX: 36.87 KG/M2 | HEIGHT: 65 IN | HEART RATE: 101 BPM | SYSTOLIC BLOOD PRESSURE: 115 MMHG | WEIGHT: 221.31 LBS | DIASTOLIC BLOOD PRESSURE: 81 MMHG

## 2025-05-01 DIAGNOSIS — F41.9 ANXIETY: ICD-10-CM

## 2025-05-01 DIAGNOSIS — B37.2 YEAST DERMATITIS: ICD-10-CM

## 2025-05-01 DIAGNOSIS — M54.40 LOW BACK PAIN WITH SCIATICA, SCIATICA LATERALITY UNSPECIFIED, UNSPECIFIED BACK PAIN LATERALITY, UNSPECIFIED CHRONICITY: ICD-10-CM

## 2025-05-01 DIAGNOSIS — L50.8 CHRONIC URTICARIA: ICD-10-CM

## 2025-05-01 DIAGNOSIS — L40.50 PSA (PSORIATIC ARTHRITIS): ICD-10-CM

## 2025-05-01 DIAGNOSIS — E11.51 DM (DIABETES MELLITUS) TYPE II, CONTROLLED, WITH PERIPHERAL VASCULAR DISORDER: Primary | ICD-10-CM

## 2025-05-01 DIAGNOSIS — R79.82 ELEVATED C-REACTIVE PROTEIN (CRP): ICD-10-CM

## 2025-05-01 DIAGNOSIS — L40.8 PSORIASIS WITH PUSTULES: ICD-10-CM

## 2025-05-01 DIAGNOSIS — E66.01 SEVERE OBESITY (BMI 35.0-35.9 WITH COMORBIDITY): ICD-10-CM

## 2025-05-01 PROCEDURE — 3079F DIAST BP 80-89 MM HG: CPT | Mod: CPTII,S$GLB,, | Performed by: INTERNAL MEDICINE

## 2025-05-01 PROCEDURE — 1160F RVW MEDS BY RX/DR IN RCRD: CPT | Mod: CPTII,S$GLB,, | Performed by: INTERNAL MEDICINE

## 2025-05-01 PROCEDURE — 99215 OFFICE O/P EST HI 40 MIN: CPT | Mod: 25,S$GLB,, | Performed by: INTERNAL MEDICINE

## 2025-05-01 PROCEDURE — 1159F MED LIST DOCD IN RCRD: CPT | Mod: CPTII,S$GLB,, | Performed by: INTERNAL MEDICINE

## 2025-05-01 PROCEDURE — 3074F SYST BP LT 130 MM HG: CPT | Mod: CPTII,S$GLB,, | Performed by: INTERNAL MEDICINE

## 2025-05-01 PROCEDURE — 3008F BODY MASS INDEX DOCD: CPT | Mod: CPTII,S$GLB,, | Performed by: INTERNAL MEDICINE

## 2025-05-01 PROCEDURE — 3288F FALL RISK ASSESSMENT DOCD: CPT | Mod: CPTII,S$GLB,, | Performed by: INTERNAL MEDICINE

## 2025-05-01 PROCEDURE — 99999 PR PBB SHADOW E&M-EST. PATIENT-LVL IV: CPT | Mod: PBBFAC,,, | Performed by: INTERNAL MEDICINE

## 2025-05-01 PROCEDURE — 96372 THER/PROPH/DIAG INJ SC/IM: CPT | Mod: S$GLB,,, | Performed by: INTERNAL MEDICINE

## 2025-05-01 PROCEDURE — 1101F PT FALLS ASSESS-DOCD LE1/YR: CPT | Mod: CPTII,S$GLB,, | Performed by: INTERNAL MEDICINE

## 2025-05-01 RX ORDER — FLUCONAZOLE 150 MG/1
150 TABLET ORAL DAILY
Qty: 10 TABLET | Refills: 3 | Status: SHIPPED | OUTPATIENT
Start: 2025-05-01 | End: 2025-05-01 | Stop reason: SDUPTHER

## 2025-05-01 RX ORDER — CLOTRIMAZOLE AND BETAMETHASONE DIPROPIONATE 10; .64 MG/G; MG/G
CREAM TOPICAL 2 TIMES DAILY
Qty: 45 G | Refills: 3 | Status: SHIPPED | OUTPATIENT
Start: 2025-05-01 | End: 2025-05-01 | Stop reason: SDUPTHER

## 2025-05-01 RX ORDER — KETOROLAC TROMETHAMINE 30 MG/ML
60 INJECTION, SOLUTION INTRAMUSCULAR; INTRAVENOUS
Status: COMPLETED | OUTPATIENT
Start: 2025-05-01 | End: 2025-05-01

## 2025-05-01 RX ORDER — OMEGA-3 FATTY ACIDS 1000 MG
1 CAPSULE ORAL DAILY
COMMUNITY
Start: 2025-05-01 | End: 2026-05-01

## 2025-05-01 RX ORDER — APREMILAST 10-20-30MG
KIT ORAL
Qty: 55 TABLET | Refills: 0 | Status: ACTIVE | OUTPATIENT
Start: 2025-05-01

## 2025-05-01 RX ORDER — APREMILAST 30 MG/1
30 TABLET, FILM COATED ORAL 2 TIMES DAILY
Qty: 60 TABLET | Refills: 11 | Status: ACTIVE | OUTPATIENT
Start: 2025-05-01 | End: 2026-05-01

## 2025-05-01 RX ORDER — ALPRAZOLAM 0.25 MG/1
0.25 TABLET ORAL 4 TIMES DAILY PRN
Qty: 28 TABLET | Refills: 0 | Status: SHIPPED | OUTPATIENT
Start: 2025-05-01 | End: 2025-05-01 | Stop reason: SDUPTHER

## 2025-05-01 RX ORDER — HYDROXYZINE HYDROCHLORIDE 25 MG/1
25 TABLET, FILM COATED ORAL NIGHTLY
Qty: 30 TABLET | Refills: 1 | Status: SHIPPED | OUTPATIENT
Start: 2025-05-01 | End: 2025-05-01 | Stop reason: SDUPTHER

## 2025-05-01 RX ADMIN — KETOROLAC TROMETHAMINE 60 MG: 30 INJECTION, SOLUTION INTRAMUSCULAR; INTRAVENOUS at 12:05

## 2025-05-01 ASSESSMENT — ROUTINE ASSESSMENT OF PATIENT INDEX DATA (RAPID3)
MDHAQ FUNCTION SCORE: 0.5
FATIGUE SCORE: 1.1
PAIN SCORE: 4.5
PATIENT GLOBAL ASSESSMENT SCORE: 4
TOTAL RAPID3 SCORE: 3.39
PSYCHOLOGICAL DISTRESS SCORE: 1.1

## 2025-05-01 NOTE — PROGRESS NOTES
2-patient identifiers confirmed, allergies reviewed, injection education discussed with patient.  Injections tolerated well with no C/O of pain or complications, see MAR for injection information.  Patient exited room ambulatory in stable condition.  DURAN Guillaume LPN

## 2025-05-01 NOTE — PROGRESS NOTES
Subjective:     Patient ID:  Dennise Watkins    Chief Complaint:  Disease Management     History of Present Illness:  Pt is a 65 y.o. female  female on psoriatic arthritis.she restarted her mounjaro with endocrine and her blood glucose and sed rate is  normal but her Scalp psoriasis last 2 week she has more stiffness throughout her body that is worse in the middle of the night.  Psoriasis is  on the scalp.  Esr is normal , crp elevated,  Patient complains of arthralgias and myalgias for which has been present for a few years. Pain is located in multiple joints, both shoulder(s), both elbow(s), both wrist(s), both MCP(s): 1st, 2nd, 3rd, 4th and 5th, both PIP(s): 1st, 2nd, 3rd, 4th and 5th, both DIP(s): 1st and 2nd, both hip(s), both knee(s) and both MTP(s): 1st, 2nd, 3rd, 4th and 5th, is described as aching, pulsating, shooting and throbbing, and is constant, moderate .  Associated symptoms include: crepitation, decreased range of motion, edema, effusion, tenderness and warmth.             Prior tx:  1. NSAIDs       Rheumatologic History:   - Diagnosis/es:  - Positive serologies:  - Infectious screening labs:  - Previous Treatments:  - Current Treatments:     Interval History:   Hospitalization since last office visit: No    Patient Active Problem List    Diagnosis Date Noted    Severe obesity (BMI 35.0-35.9 with comorbidity) 07/11/2024    Intractable migraine without aura and without status migrainosus 07/20/2023    Colon cancer screening 02/06/2023    Psoriatic arthritis 02/06/2023    Current mild episode of major depressive disorder without prior episode 02/06/2023    Menopause 08/27/2020    Controlled type 2 diabetes mellitus without complication, without long-term current use of insulin 08/03/2020    Chronic rhinitis 03/07/2019    Pollen-food allergy 03/07/2019    Chronic urticaria 03/07/2019    Hypothyroidism 03/07/2019    Simple chronic conjunctivitis of both eyes 03/07/2019    SVT (supraventricular  tachycardia) 09/24/2018    Anxiety disorder 09/24/2018    Hypertension, well controlled 07/06/2016    Recurrent UTI 07/01/2016    DM (diabetes mellitus), type 2, uncontrolled 07/01/2016     Past Surgical History:   Procedure Laterality Date    ABLATION N/A 09/28/2018    Procedure: Ablation;  Surgeon: Bunny Humphreys MD;  [facility] CATH LAB;  Service: Cardiology;  Laterality: N/A;  SVT ,RFA, CHELE, MAC, SK ,3 Prep    COLONOSCOPY W/ POLYPECTOMY  02/03/2025    Dr Huang Guillory    ESOPHAGOGASTRODUODENOSCOPY      HYSTERECTOMY  2000    OOPHORECTOMY  2000    RHINOPLASTY TIP      TONSILLECTOMY, ADENOIDECTOMY       Social History[1]  No family history on file.  Review of patient's allergies indicates:   Allergen Reactions    Sulfa (sulfonamide antibiotics) Hives    Aleve [naproxen sodium] Other (See Comments)     Nose bleed    Amoxicillin-pot clavulanate Diarrhea     Other reaction(s): diarrhea       Review of Systems   Review of Systems   Constitutional:  Positive for chills and fatigue. Negative for activity change, appetite change, diaphoresis, fever and unexpected weight change.   HENT:  Negative for congestion, dental problem, ear discharge, ear pain, facial swelling, mouth sores, nosebleeds, postnasal drip, rhinorrhea, sinus pressure, sneezing, sore throat, tinnitus, trouble swallowing and voice change.    Eyes:  Negative for photophobia, pain, discharge, redness and itching.   Respiratory:  Positive for cough. Negative for apnea, chest tightness, shortness of breath and wheezing.    Cardiovascular:  Positive for leg swelling. Negative for chest pain and palpitations.   Gastrointestinal:  Positive for abdominal pain. Negative for abdominal distention, constipation, diarrhea, nausea and vomiting.   Endocrine: Negative for cold intolerance, heat intolerance, polydipsia and polyuria.   Genitourinary:  Negative for decreased urine volume, difficulty urinating, dysuria, flank pain, frequency, hematuria and urgency.    Musculoskeletal:  Positive for arthralgias, back pain, gait problem, joint swelling, myalgias, neck pain and neck stiffness.   Skin:  Negative for pallor, rash and wound.   Allergic/Immunologic: Negative for immunocompromised state.   Neurological:  Negative for dizziness, tremors, numbness and headaches.   Hematological:  Negative for adenopathy. Does not bruise/bleed easily.   Psychiatric/Behavioral:  Negative for sleep disturbance. The patient is not nervous/anxious.         Current Medications:  Current Outpatient Medications   Medication Instructions    ALPRAZolam (XANAX) 0.25 mg, Oral, 4 times daily PRN    amLODIPine (NORVASC) 5 mg, Daily PRN    apremilast (OTEZLA STARTER) 10 mg (4)-20 mg (4)-30 mg (47) DsPk As directed    baclofen (LIORESAL) 20 MG tablet TAKE 1 TABLET BY MOUTH THREE TIMES A DAY    clotrimazole (LOTRIMIN) 1 % cream Topical (Top), 2 times daily    clotrimazole-betamethasone 1-0.05% (LOTRISONE) cream Topical (Top), 2 times daily    EScitalopram oxalate (LEXAPRO) 5 MG Tab TAKE 1 TABLET BY MOUTH EVERY DAY    estradioL (ESTRACE) 1 mg, Daily    famotidine (PEPCID) 40 mg, Oral, Nightly PRN    fluconazole (DIFLUCAN) 150 mg, Oral, Daily    hydrOXYzine HCL (ATARAX) 25 mg, Oral, Nightly    ketoconazole (NIZORAL) 2 % cream 1 application , Topical (Top), 2 times daily    ketoconazole (NIZORAL) 2 % cream Topical (Top), Daily    ketoconazole (NIZORAL) 2 % shampoo Topical (Top), Twice weekly    levocetirizine (XYZAL) 5 mg, Oral, 2 times daily    metoprolol succinate (TOPROL-XL) 25 mg, Oral, Daily    mometasone 0.1% (ELOCON) 0.1 % cream Apply to affected area daily    MOUNJARO 5 mg, Subcutaneous, Every 7 days    NATURE-THROID 65 mg Tab 1 tablet, Oral, Every morning    NURTEC 75 mg, Oral, Daily PRN, Place ODT tablet on the tongue; alternatively the ODT tablet may be placed under the tongue    olmesartan-hydrochlorothiazide (BENICAR HCT) 40-12.5 mg Tab 1 tablet, Oral, Daily    olopatadine (PATANOL) 0.1 %  "ophthalmic solution 1 drop, Both Eyes, 2 times daily    omega-3 fatty acids 1,000 mg, Oral, Daily    omeprazole (PRILOSEC) 20 mg, Oral, 2 times daily    OTEZLA 30 mg, Oral, 2 times daily    tiZANidine (ZANAFLEX) 4 MG tablet Half or full tablet by mouth at night as needed for muscle spasm         Objective:     Vitals:    05/01/25 1057   BP: 115/81   Pulse: 101   Weight: 100.4 kg (221 lb 5.5 oz)   Height: 5' 5" (1.651 m)   PainSc:   5   PainLoc: Generalized      Body mass index is 36.83 kg/m².     Physical Examinations:  Physical Exam   Constitutional: She is oriented to person, place, and time.   HENT:   Head: Normocephalic and atraumatic.   Mouth/Throat: Oropharynx is clear and moist.   Eyes: Pupils are equal, round, and reactive to light.   Neck: No thyromegaly present.   Cardiovascular: Normal rate, regular rhythm and normal heart sounds. Exam reveals no gallop and no friction rub.   No murmur heard.  Pulmonary/Chest: Breath sounds normal. She has no wheezes. She has no rales. She exhibits no tenderness.   Abdominal: There is no abdominal tenderness. There is no rebound and no guarding.   Musculoskeletal:         General: Tenderness and deformity present.      Right shoulder: Tenderness present.      Left shoulder: Tenderness present.      Right elbow: Normal.      Left elbow: Normal.      Cervical back: Neck supple.      Right knee: Swelling and effusion present. Tenderness present.      Left knee: Effusion present.   Lymphadenopathy:     She has no cervical adenopathy.   Neurological: She is alert and oriented to person, place, and time. She has normal sensation. Gait normal.   Reflex Scores:       Patellar reflexes are 3+ on the right side and 3+ on the left side.  Skin: No rash noted. No erythema. No pallor.   Psychiatric: Mood and affect normal.   Vitals reviewed.      Right Side Rheumatological Exam     Examination finds the elbow, 1st MCP, 2nd MCP, 3rd MCP, 4th MCP and 5th MCP normal.    The patient is " tender to palpation of the shoulder and knee    She has swelling of the knee    The patient has an enlarged wrist, 1st PIP, 2nd PIP, 3rd PIP, 4th PIP and 5th PIP    Shoulder Exam   Tenderness Location: acromion    Range of Motion   Active abduction:  abnormal   Adduction: abnormal  Sensation: normal    Knee Exam   Tenderness Location: medial joint line  Patellofemoral Crepitus: positive  Effusion: positive  Sensation: normal    Hip Exam   Tenderness Location: no tenderness  Sensation: normal    Elbow/Wrist Exam   Tenderness Location: no tenderness  Sensation: normal    Left Side Rheumatological Exam     Examination finds the elbow, 1st MCP, 2nd MCP, 3rd MCP, 4th MCP and 5th MCP normal.    The patient is tender to palpation of the shoulder.    The patient has an enlarged wrist, 1st PIP, 2nd PIP, 3rd PIP, 4th PIP and 5th PIP.    Shoulder Exam   Tenderness Location: acromion    Range of Motion   Active abduction:  abnormal   Sensation: normal    Knee Exam   Tenderness Location: lateral joint line and medial joint line    Patellofemoral Crepitus: positive  Effusion: positive  Sensation: normal    Hip Exam   Tenderness Location: no tenderness  Sensation: normal    Elbow/Wrist Exam   Sensation: normal      Back/Neck Exam   General Inspection   Gait: normal       Tenderness Right paramedian tenderness of the Lower C-Spine and Lower L-Spine.Left paramedian tenderness of the Upper C-Spine and Lower L-Spine.         Disease Assessment Scores:  Patient's Global Assessment of arthritis (0-10): 1  Physician's Global Assessment of arthritis (0-10): 1  Number of Tender Joints (0-28): 2  Number of Swollen Joints (0-28): 2         No data to display                Monitoring Lab Results:  Lab Results   Component Value Date    WBC 8.46 04/25/2025    RBC 5.58 (H) 04/25/2025    HGB 14.7 04/25/2025    HCT 46.0 04/25/2025    MCV 82 04/25/2025    MCH 26.3 (L) 04/25/2025    MCHC 32.0 04/25/2025    RDW 14.0 04/25/2025     04/25/2025  "       Lab Results   Component Value Date     04/25/2025    K 4.5 04/25/2025     04/25/2025    CO2 22 (L) 04/25/2025     (H) 04/25/2025    BUN 21 04/25/2025    CREATININE 0.82 04/25/2025    CALCIUM 10.4 04/25/2025    PROT 6.9 04/25/2025    ALBUMIN 4.1 04/25/2025    BILITOT 0.4 04/25/2025    ALKPHOS 99 04/25/2025    AST 25 04/25/2025    ALT 23 04/25/2025    ANIONGAP 12 04/25/2025    EGFRNORACEVR >60 04/25/2025       Lab Results   Component Value Date    SEDRATE 23 04/25/2025    CRP 15.6 (H) 04/25/2025        No results found for: "BARTJMQC82DJ", "KJVCYMQQ83"     Lab Results   Component Value Date    CHOL 193 07/17/2024    HDL 47 07/17/2024    LDLCALC 120 (H) 07/17/2024    TRIG 146 07/17/2024       Lab Results   Component Value Date    RF <14 03/18/2021     Lab Results   Component Value Date    ANASCREEN None Detected 06/16/2023    DSDNA <1 03/18/2021    SMRNPAB <1.0 NEG 03/23/2021    ZZR68LO <1.0 NEG 03/18/2021     No results found for: "HLABB27"    Infectious Disease Screening:  No results found for: "HEPBSAG", "HEPBCAB", "HEPBSAB", "HEPBSURFABQU", "HEPBIGM"  Lab Results   Component Value Date    HEPCAB NON-REACTIVE 10/16/2020     No results found for: "TBGOLDPLUS", "QUANTTBGDPL"  Lab Results   Component Value Date    QINT see note 03/18/2021        Imaging: DEXA, Xrays, MRIs, CTs, etc    Old & Outside Medical Records:  Reviewed old and all outside medical records available in Care Everywhere     Assessment:     Encounter Diagnoses   Name Primary?    DM (diabetes mellitus) type II, controlled, with peripheral vascular disorder Yes    PSA (psoriatic arthritis)     Severe obesity (BMI 35.0-35.9 with comorbidity)     Elevated C-reactive protein (CRP)     Psoriasis with pustules     Chronic urticaria     Yeast dermatitis     Anxiety     Low back pain with sciatica, sciatica laterality unspecified, unspecified back pain laterality, unspecified chronicity         Plan:      Encounter Diagnoses   Name " Primary?    PSA (psoriatic arthritis)     Severe obesity (BMI 35.0-35.9 with comorbidity)     DM (diabetes mellitus) type II, controlled, with peripheral vascular disorder Yes    Elevated C-reactive protein (CRP)     Psoriasis with pustules     Chronic urticaria     Yeast dermatitis     Anxiety     Low back pain with sciatica, sciatica laterality unspecified, unspecified back pain laterality, unspecified chronicity      Dennise was seen today for disease management.    Diagnoses and all orders for this visit:    DM (diabetes mellitus) type II, controlled, with peripheral vascular disorder  -     omega-3 fatty acids 1,000 mg Cap; Take 1 capsule (1,000 mg total) by mouth once daily.  -     ketorolac injection 60 mg    PSA (psoriatic arthritis)  -     omega-3 fatty acids 1,000 mg Cap; Take 1 capsule (1,000 mg total) by mouth once daily.  -     apremilast (OTEZLA STARTER) 10 mg (4)-20 mg (4)-30 mg (47) DsPk; As directed  -     apremilast (OTEZLA) 30 mg Tab; Take 1 tablet (30 mg total) by mouth 2 (two) times daily.  -     ketorolac injection 60 mg  -     Sedimentation rate; Future  -     C-Reactive Protein; Future  -     Comprehensive Metabolic Panel; Future  -     CBC Auto Differential; Future    Severe obesity (BMI 35.0-35.9 with comorbidity)  -     omega-3 fatty acids 1,000 mg Cap; Take 1 capsule (1,000 mg total) by mouth once daily.    Elevated C-reactive protein (CRP)  -     omega-3 fatty acids 1,000 mg Cap; Take 1 capsule (1,000 mg total) by mouth once daily.  -     Sedimentation rate; Future  -     C-Reactive Protein; Future  -     Comprehensive Metabolic Panel; Future  -     CBC Auto Differential; Future    Psoriasis with pustules  -     omega-3 fatty acids 1,000 mg Cap; Take 1 capsule (1,000 mg total) by mouth once daily.  -     apremilast (OTEZLA STARTER) 10 mg (4)-20 mg (4)-30 mg (47) DsPk; As directed  -     apremilast (OTEZLA) 30 mg Tab; Take 1 tablet (30 mg total) by mouth 2 (two) times daily.  -      Sedimentation rate; Future  -     C-Reactive Protein; Future  -     Comprehensive Metabolic Panel; Future  -     CBC Auto Differential; Future    Chronic urticaria  -     hydrOXYzine HCL (ATARAX) 25 MG tablet; Take 1 tablet (25 mg total) by mouth every evening.  -     clotrimazole-betamethasone 1-0.05% (LOTRISONE) cream; Apply topically 2 (two) times daily.  -     Sedimentation rate; Future  -     C-Reactive Protein; Future  -     Comprehensive Metabolic Panel; Future  -     CBC Auto Differential; Future    Yeast dermatitis  -     clotrimazole-betamethasone 1-0.05% (LOTRISONE) cream; Apply topically 2 (two) times daily.    Anxiety  -     ALPRAZolam (XANAX) 0.25 MG tablet; Take 1 tablet (0.25 mg total) by mouth 4 (four) times daily as needed for Anxiety.    Low back pain with sciatica, sciatica laterality unspecified, unspecified back pain laterality, unspecified chronicity  -     Sedimentation rate; Future  -     C-Reactive Protein; Future  -     Comprehensive Metabolic Panel; Future  -     CBC Auto Differential; Future    Other orders  -     fluconazole (DIFLUCAN) 150 MG Tab; Take 1 tablet (150 mg total) by mouth once daily.        1. Start otezla for psa. pso  2. Labs order  3. Nurse injection     Follow-up 6 months    More than 50% of the  40 minute encounter was spent face to face counseling the patient regarding current status and future plan of care as well as side effects  of the medications. All questions were answered to patient's satisfaction also includes  non-face to face time preparing to see the patient (eg, review of tests), Obtaining and/or reviewing separately obtained history, Documenting clinical information in the electronic or other health record, Independently interpreting results            [1]   Social History  Tobacco Use    Smoking status: Never    Smokeless tobacco: Never   Substance Use Topics    Alcohol use: No    Drug use: No

## 2025-05-10 RX ORDER — ALPRAZOLAM 0.25 MG/1
0.25 TABLET ORAL 4 TIMES DAILY PRN
Qty: 28 TABLET | Refills: 0 | Status: SHIPPED | OUTPATIENT
Start: 2025-05-10 | End: 2025-05-17

## 2025-05-10 RX ORDER — CLOTRIMAZOLE AND BETAMETHASONE DIPROPIONATE 10; .64 MG/G; MG/G
CREAM TOPICAL 2 TIMES DAILY
Qty: 45 G | Refills: 3 | Status: SHIPPED | OUTPATIENT
Start: 2025-05-10

## 2025-05-10 RX ORDER — FLUCONAZOLE 150 MG/1
150 TABLET ORAL DAILY
Qty: 10 TABLET | Refills: 3 | Status: SHIPPED | OUTPATIENT
Start: 2025-05-10

## 2025-05-10 RX ORDER — HYDROXYZINE HYDROCHLORIDE 25 MG/1
25 TABLET, FILM COATED ORAL NIGHTLY
Qty: 30 TABLET | Refills: 1 | Status: SHIPPED | OUTPATIENT
Start: 2025-05-10

## 2025-06-27 ENCOUNTER — OFFICE VISIT (OUTPATIENT)
Dept: ENDOCRINOLOGY | Facility: CLINIC | Age: 65
End: 2025-06-27
Payer: MEDICARE

## 2025-06-27 VITALS
WEIGHT: 218.81 LBS | DIASTOLIC BLOOD PRESSURE: 76 MMHG | HEART RATE: 86 BPM | BODY MASS INDEX: 36.41 KG/M2 | SYSTOLIC BLOOD PRESSURE: 104 MMHG

## 2025-06-27 DIAGNOSIS — E11.9 TYPE 2 DIABETES MELLITUS WITHOUT COMPLICATION, WITHOUT LONG-TERM CURRENT USE OF INSULIN: Primary | ICD-10-CM

## 2025-06-27 DIAGNOSIS — I10 HYPERTENSION, WELL CONTROLLED: ICD-10-CM

## 2025-06-27 DIAGNOSIS — E03.9 ACQUIRED HYPOTHYROIDISM: ICD-10-CM

## 2025-06-27 PROCEDURE — 99999 PR PBB SHADOW E&M-EST. PATIENT-LVL IV: CPT | Mod: PBBFAC,,, | Performed by: NURSE PRACTITIONER

## 2025-06-27 RX ORDER — BLOOD-GLUCOSE SENSOR
1 EACH MISCELLANEOUS
Qty: 2 EACH | Refills: 12 | Status: SHIPPED | OUTPATIENT
Start: 2025-06-27 | End: 2026-06-27

## 2025-06-27 NOTE — PATIENT INSTRUCTIONS
Continuous Glucose Sensors.     Freestyle Lázaro--requires a prescription.  Insurance generally mandates the use of insulin for coverage.   If it is not covered by your insurance and you have commercial insurance (not medicare, medicaid or ),  you can often get through the pharmacy at a 50% discount.    Dexcom--requires prescription.  Insurance generally mandates the use of insulin for coverage.     Stelo--made by Dexcom--available as an over the counter option.  These are available online at [email or web address].com     Lingo--by Abbot  or Freestyle----available as on over the counter option.  These are available online at [email or web address]

## 2025-06-27 NOTE — PROGRESS NOTES
HTNSubjective:       Patient ID: Dennise Watkins is a 65 y.o. female.    Chief Complaint: Diabetes     HPI Pt is a 65 y.o. wf  with a diagnosis of Type 2 diabetes mellitus diagnosed approximately 2020 preceding borderline since 2010, , as well as chronic conditions pending review including HTN, hypothyroidism .  Other pertinent medical and social information noted includes, but not limited to: Former school employee--teacher/sub dn .       Interim Events:June 27, 2025:  No acute events or focal complaints.  Excellent DM control. ONly on 5 mg mounjaro. DOes not desire increase. Wt stable.  She is working on increasing exercise more. Wants to try and get sensor again-.       Current DM meds:   mounjaro.        Failed DM meds:  none   Statin: atorvastatin 10 mg        Not tolerated statin : na   ACE/ARB:olmesartan 40 mg        Not tolerated ACE/ARB: na   Known Diabetic complications: na           Lang 15, 2025:  Hadn't seen in 2 years.  Dr. Gonzalez switched her to Mounjaro for better wt loss to help improve overall inflammation and then unable to get refills so she returned.  Has been off x 2 months and partially r/t stomach virus after returning from trip.  No focal complaints or acute events. Now retired but remains on [employer].        march 16, 2023:  Pt restarted ozempic--currently tolerating 0.25 mg.  A1C excellent.  No wt loss though.  No focal complaints. Still working on adjusting to life without      Sept 13, 2022: Pt new to me-self referred.  Been borderline DM for years and finally crossed the border.  Was using Ozempic--worked well--but she mentioned she was having some GERD so provider would not refill.Pt states she was finally starting to tolerate when it was stopped.  No focal complaints.  Unfortunately lost her very healthy  to Covid 1 yr ago, so trying to get back on track with her new life.       Review of Systems   Constitutional:  Negative for activity  change and fatigue.   HENT:  Negative for hearing loss and trouble swallowing.    Eyes:  Negative for photophobia and visual disturbance.        Last Eye Exam:    Respiratory:  Negative for cough and shortness of breath.    Cardiovascular:  Negative for chest pain and palpitations.   Gastrointestinal:  Negative for constipation and diarrhea.   Genitourinary:  Negative for frequency and urgency.   Musculoskeletal:  Negative for arthralgias and myalgias.   Integumentary:  Negative for rash and wound.   Neurological:  Negative for weakness and numbness.   Psychiatric/Behavioral:  Negative for sleep disturbance. The patient is not nervous/anxious.          Objective:      Physical Exam  Constitutional:       Appearance: Normal appearance.   HENT:      Head: Normocephalic and atraumatic.      Nose: Nose normal.   Eyes:      Extraocular Movements: Extraocular movements intact.      Conjunctiva/sclera: Conjunctivae normal.      Pupils: Pupils are equal, round, and reactive to light.   Cardiovascular:      Rate and Rhythm: Normal rate and regular rhythm.   Pulmonary:      Effort: Pulmonary effort is normal.      Breath sounds: Normal breath sounds.   Musculoskeletal:         General: Normal range of motion.      Cervical back: Normal range of motion and neck supple.      Right lower leg: No edema.      Left lower leg: No edema.      Comments: Feet:   Lymphadenopathy:      Cervical: No cervical adenopathy.   Skin:     General: Skin is warm and dry.   Neurological:      General: No focal deficit present.      Mental Status: She is alert and oriented to person, place, and time.   Psychiatric:         Mood and Affect: Mood normal.         Behavior: Behavior normal.         Thought Content: Thought content normal.         Judgment: Judgment normal.         /76   Pulse 86   Wt 99.2 kg (218 lb 12.9 oz)   BMI 36.41 kg/m²     Hemoglobin A1C   Date Value Ref Range Status   06/24/2025 5.8 (H) 4.0 - 5.6 % Final     Comment:      Reference Interval:  5.0 - 5.6 Normal   5.7 - 6.4 High Risk   > 6.5 Diabetic      Hgb A1c results are standardized based on the (NGSP) National   Glycohemoglobin Standardization Program.      Hemoglobin A1C levels are related to mean serum/plasma glucose   during the preceding 2-3 months.        06/16/2023 6.7 (H) 0.0 - 5.6 % Final     Comment:     Reference Interval:  5.0 - 5.6 Normal   5.7 - 6.4 High Risk   > 6.5 Diabetic      Hgb A1c results are standardized based on the (NGSP) National   Glycohemoglobin Standardization Program.      Hemoglobin A1C levels are related to mean serum/plasma glucose   during the preceding 2-3 months.        01/13/2023 6.5 (H) 0.0 - 5.6 % Final     Comment:     Reference Interval:  5.0 - 5.6 Normal   5.7 - 6.4 High Risk   > 6.5 Diabetic      Hgb A1c results are standardized based on the (NGSP) National   Glycohemoglobin Standardization Program.      Hemoglobin A1C levels are related to mean serum/plasma glucose   during the preceding 2-3 months.        06/11/2020 6.6 % Final     Comment:     ordered by Derrick Harris [place]     Hemoglobin A1c   Date Value Ref Range Status   06/11/2024 6.6 (H) 4.8 - 5.6 % Final     Comment:              Prediabetes: 5.7 - 6.4           Diabetes: >6.4           Glycemic control for adults with diabetes: <7.0     03/07/2024 7.2 (H) 4.8 - 5.6 % Final     Comment:              Prediabetes: 5.7 - 6.4           Diabetes: >6.4           Glycemic control for adults with diabetes: <7.0         Chemistry        Component Value Date/Time     06/24/2025 1128    K 4.2 06/24/2025 1128     06/24/2025 1128    CO2 27 06/24/2025 1128    BUN 17 06/24/2025 1128    CREATININE 0.84 06/24/2025 1128     06/24/2025 1128        Component Value Date/Time    CALCIUM 9.8 06/24/2025 1128    ALKPHOS 100 06/24/2025 1128    AST 22 06/24/2025 1128    ALT 21 06/24/2025 1128    BILITOT 0.4 06/24/2025 1128    ESTGFRAFRICA 78 02/11/2022 0956     "EGFRNONAA 68 02/11/2022 0956          Lab Results   Component Value Date    CHOL 191 06/24/2025     Lab Results   Component Value Date    HDL 51 06/24/2025     Lab Results   Component Value Date    LDLCALC 108.2 06/24/2025     Lab Results   Component Value Date    TRIG 159 (H) 06/24/2025     Lab Results   Component Value Date    CHOLHDL 26.7 06/24/2025     Lab Results   Component Value Date    MICALBCREAT Unable to calculate 06/24/2025     Lab Results   Component Value Date    TSH 1.737 04/25/2025     No results found for: "[id number]"        Assessment:      1. Type 2 diabetes mellitus without complication, without long-term current use of insulin  blood-glucose sensor (FREESTYLE AVIS 3 PLUS SENSOR) [place]ie    Hemoglobin A1C    CANCELED: Hemoglobin A1C      2. Hypertension, well controlled        3. Acquired hypothyroidism              Plan:       Cont  mounjaro 5 mg   Cont atorvastatin  Cont olmesartan      Will refill sensor--but if not covererd provided info on OTC options     ORDERS 06/27/2025     6 months with  A1c prior             "

## 2025-08-13 DIAGNOSIS — E11.9 TYPE 2 DIABETES MELLITUS WITHOUT COMPLICATION, WITHOUT LONG-TERM CURRENT USE OF INSULIN: ICD-10-CM

## 2025-08-14 RX ORDER — TIRZEPATIDE 5 MG/.5ML
5 INJECTION, SOLUTION SUBCUTANEOUS
Qty: 4 PEN | Refills: 3 | Status: SHIPPED | OUTPATIENT
Start: 2025-08-14